# Patient Record
Sex: MALE | Race: BLACK OR AFRICAN AMERICAN | NOT HISPANIC OR LATINO | Employment: UNEMPLOYED | URBAN - METROPOLITAN AREA
[De-identification: names, ages, dates, MRNs, and addresses within clinical notes are randomized per-mention and may not be internally consistent; named-entity substitution may affect disease eponyms.]

---

## 2022-04-02 ENCOUNTER — HISTORICAL (OUTPATIENT)
Dept: ADMINISTRATIVE | Facility: HOSPITAL | Age: 47
End: 2022-04-02

## 2022-11-09 ENCOUNTER — HOSPITAL ENCOUNTER (EMERGENCY)
Facility: HOSPITAL | Age: 47
Discharge: HOME | End: 2022-11-09
Attending: FAMILY MEDICINE
Payer: MEDICAID

## 2022-11-09 VITALS
BODY MASS INDEX: 36.92 KG/M2 | OXYGEN SATURATION: 99 % | TEMPERATURE: 99 F | DIASTOLIC BLOOD PRESSURE: 89 MMHG | HEART RATE: 87 BPM | HEIGHT: 68 IN | WEIGHT: 243.63 LBS | SYSTOLIC BLOOD PRESSURE: 134 MMHG | RESPIRATION RATE: 17 BRPM

## 2022-11-09 DIAGNOSIS — K42.9 UMBILICAL HERNIA WITHOUT OBSTRUCTION AND WITHOUT GANGRENE: ICD-10-CM

## 2022-11-09 DIAGNOSIS — R10.9 ABDOMINAL PAIN: ICD-10-CM

## 2022-11-09 DIAGNOSIS — K52.9 ENTERITIS: Primary | ICD-10-CM

## 2022-11-09 LAB
ALBUMIN SERPL-MCNC: 4.4 GM/DL (ref 3.5–5)
ALBUMIN/GLOB SERPL: 1.4 RATIO (ref 1.1–2)
ALP SERPL-CCNC: 63 UNIT/L (ref 40–150)
ALT SERPL-CCNC: 22 UNIT/L (ref 0–55)
APPEARANCE UR: CLEAR
AST SERPL-CCNC: 17 UNIT/L (ref 5–34)
BACTERIA #/AREA URNS AUTO: ABNORMAL /HPF
BASOPHILS # BLD AUTO: 0.01 X10(3)/MCL (ref 0–0.2)
BASOPHILS NFR BLD AUTO: 0.1 %
BILIRUB UR QL STRIP.AUTO: NEGATIVE MG/DL
BILIRUBIN DIRECT+TOT PNL SERPL-MCNC: 0.9 MG/DL
BUN SERPL-MCNC: 13.8 MG/DL (ref 8.9–20.6)
CALCIUM SERPL-MCNC: 9.8 MG/DL (ref 8.4–10.2)
CHLORIDE SERPL-SCNC: 104 MMOL/L (ref 98–107)
CO2 SERPL-SCNC: 25 MMOL/L (ref 22–29)
COLOR UR AUTO: YELLOW
CREAT SERPL-MCNC: 1.1 MG/DL (ref 0.73–1.18)
EOSINOPHIL # BLD AUTO: 0 X10(3)/MCL (ref 0–0.9)
EOSINOPHIL NFR BLD AUTO: 0 %
ERYTHROCYTE [DISTWIDTH] IN BLOOD BY AUTOMATED COUNT: 13.4 % (ref 11.5–17)
GFR SERPLBLD CREATININE-BSD FMLA CKD-EPI: >60 MLS/MIN/1.73/M2
GLOBULIN SER-MCNC: 3.1 GM/DL (ref 2.4–3.5)
GLUCOSE SERPL-MCNC: 94 MG/DL (ref 74–100)
GLUCOSE UR QL STRIP.AUTO: NORMAL MG/DL
HCT VFR BLD AUTO: 46.7 % (ref 42–52)
HGB BLD-MCNC: 15.9 GM/DL (ref 14–18)
HYALINE CASTS #/AREA URNS LPF: ABNORMAL /LPF
IMM GRANULOCYTES # BLD AUTO: 0.01 X10(3)/MCL (ref 0–0.04)
IMM GRANULOCYTES NFR BLD AUTO: 0.1 %
KETONES UR QL STRIP.AUTO: ABNORMAL MG/DL
LEUKOCYTE ESTERASE UR QL STRIP.AUTO: NEGATIVE UNIT/L
LIPASE SERPL-CCNC: 54 U/L
LYMPHOCYTES # BLD AUTO: 2.17 X10(3)/MCL (ref 0.6–4.6)
LYMPHOCYTES NFR BLD AUTO: 31.5 %
MCH RBC QN AUTO: 32 PG (ref 27–31)
MCHC RBC AUTO-ENTMCNC: 34 MG/DL (ref 33–36)
MCV RBC AUTO: 94 FL (ref 80–94)
MONOCYTES # BLD AUTO: 0.37 X10(3)/MCL (ref 0.1–1.3)
MONOCYTES NFR BLD AUTO: 5.4 %
MUCOUS THREADS URNS QL MICRO: ABNORMAL /LPF
NEUTROPHILS # BLD AUTO: 4.3 X10(3)/MCL (ref 2.1–9.2)
NEUTROPHILS NFR BLD AUTO: 62.9 %
NITRITE UR QL STRIP.AUTO: NEGATIVE
NRBC BLD AUTO-RTO: 0 %
PH UR STRIP.AUTO: 8 [PH]
PLATELET # BLD AUTO: 229 X10(3)/MCL (ref 130–400)
PMV BLD AUTO: 10.2 FL (ref 7.4–10.4)
POTASSIUM SERPL-SCNC: 3.8 MMOL/L (ref 3.5–5.1)
PROT SERPL-MCNC: 7.5 GM/DL (ref 6.4–8.3)
PROT UR QL STRIP.AUTO: ABNORMAL MG/DL
RBC # BLD AUTO: 4.97 X10(6)/MCL (ref 4.7–6.1)
RBC #/AREA URNS AUTO: ABNORMAL /HPF
RBC UR QL AUTO: NEGATIVE UNIT/L
SODIUM SERPL-SCNC: 138 MMOL/L (ref 136–145)
SP GR UR STRIP.AUTO: 1.03
SQUAMOUS #/AREA URNS LPF: ABNORMAL /HPF
UROBILINOGEN UR STRIP-ACNC: ABNORMAL MG/DL
WBC # SPEC AUTO: 6.9 X10(3)/MCL (ref 4.5–11.5)
WBC #/AREA URNS AUTO: ABNORMAL /HPF

## 2022-11-09 PROCEDURE — 99284 EMERGENCY DEPT VISIT MOD MDM: CPT | Mod: 25

## 2022-11-09 PROCEDURE — 80053 COMPREHEN METABOLIC PANEL: CPT | Performed by: NURSE PRACTITIONER

## 2022-11-09 PROCEDURE — 25000003 PHARM REV CODE 250: Performed by: NURSE PRACTITIONER

## 2022-11-09 PROCEDURE — 81001 URINALYSIS AUTO W/SCOPE: CPT | Performed by: NURSE PRACTITIONER

## 2022-11-09 PROCEDURE — 85025 COMPLETE CBC W/AUTO DIFF WBC: CPT | Performed by: NURSE PRACTITIONER

## 2022-11-09 PROCEDURE — 83690 ASSAY OF LIPASE: CPT | Performed by: NURSE PRACTITIONER

## 2022-11-09 RX ORDER — ONDANSETRON 2 MG/ML
4 INJECTION INTRAMUSCULAR; INTRAVENOUS
Status: DISCONTINUED | OUTPATIENT
Start: 2022-11-09 | End: 2022-11-09

## 2022-11-09 RX ORDER — ONDANSETRON 4 MG/1
4 TABLET, ORALLY DISINTEGRATING ORAL
Status: COMPLETED | OUTPATIENT
Start: 2022-11-09 | End: 2022-11-09

## 2022-11-09 RX ORDER — KETOROLAC TROMETHAMINE 30 MG/ML
15 INJECTION, SOLUTION INTRAMUSCULAR; INTRAVENOUS
Status: DISCONTINUED | OUTPATIENT
Start: 2022-11-09 | End: 2022-11-09

## 2022-11-09 RX ORDER — DICYCLOMINE HYDROCHLORIDE 20 MG/1
20 TABLET ORAL EVERY 6 HOURS PRN
Qty: 15 TABLET | Refills: 0 | Status: ON HOLD | OUTPATIENT
Start: 2022-11-09 | End: 2023-10-11 | Stop reason: HOSPADM

## 2022-11-09 RX ORDER — HYDROCODONE BITARTRATE AND ACETAMINOPHEN 10; 325 MG/1; MG/1
1 TABLET ORAL
Status: COMPLETED | OUTPATIENT
Start: 2022-11-09 | End: 2022-11-09

## 2022-11-09 RX ADMIN — HYDROCODONE BITARTRATE AND ACETAMINOPHEN 1 TABLET: 10; 325 TABLET ORAL at 06:11

## 2022-11-09 RX ADMIN — ONDANSETRON 4 MG: 4 TABLET, ORALLY DISINTEGRATING ORAL at 06:11

## 2022-11-09 NOTE — Clinical Note
"Mir Gutiérrez" Rafiq was seen and treated in our emergency department on 11/9/2022.  He may return to work on 11/11/2022.       If you have any questions or concerns, please don't hesitate to call.      Christopher Rucker, FRANKIEP"

## 2022-11-10 NOTE — ED PROVIDER NOTES
Encounter Date: 11/9/2022       History     Chief Complaint   Patient presents with    Abdominal Pain     Pt in with complaints of abdominal pain with nausea and vomiting x 2-3 days.  Pt also complaining of a headache.     The patient presents with abdominal pain.  The onset was 5 days.  The course/duration of symptoms is constant and fluctuating in intensity.  The character of symptoms is dull.  The degree at onset was minimal.  The Location of pain at onset was generalized.  The degree at present is moderate.  The Location of pain at present is generalized.  Radiating pain: none. There are exacerbating factors including eating and movement.  The relieving factor is rest.  Therapy today: percocet, zofran.  Risk factors consist of none.  Associated symptoms: nausea, vomiting, denies chest pain, denies diarrhea, denies back pain, denies shortness of breath, denies fever, denies chills, denies headache and denies dizziness. Normal bm this morning. He was seen at [facility] 2 days ago with CT of abdomen/pelvis - umbilical hernia without evidence of bowel obstruction or strangulation.      Review of patient's allergies indicates:  No Known Allergies  History reviewed. No pertinent past medical history.  History reviewed. No pertinent surgical history.  History reviewed. No pertinent family history.  Social History     Tobacco Use    Smoking status: Every Day     Packs/day: 0.50     Types: Cigarettes    Smokeless tobacco: Never     Review of Systems   Constitutional:  Negative for fever.   HENT:  Negative for sore throat.    Respiratory:  Negative for shortness of breath.    Cardiovascular:  Negative for chest pain.   Gastrointestinal:  Positive for abdominal pain, nausea and vomiting.   Genitourinary:  Negative for dysuria.   Musculoskeletal:  Negative for back pain.   Skin:  Negative for rash.   Neurological:  Negative for weakness.   Hematological:  Does not bruise/bleed easily.   All other systems reviewed and are  negative.    Physical Exam     Initial Vitals [11/09/22 1707]   BP Pulse Resp Temp SpO2   (!) 139/92 64 14 98.5 °F (36.9 °C) 100 %      MAP       --         Physical Exam    Nursing note and vitals reviewed.  Constitutional: He appears well-developed and well-nourished.   HENT:   Head: Normocephalic and atraumatic.   Neck: Neck supple.   Normal range of motion.  Cardiovascular:  Normal rate, regular rhythm, normal heart sounds and intact distal pulses.           Pulmonary/Chest: Breath sounds normal.   Abdominal: Abdomen is soft. Bowel sounds are normal. There is generalized abdominal tenderness.   Mild generalized ttp, small reducible umbilical hernia   Musculoskeletal:         General: Normal range of motion.      Cervical back: Normal range of motion and neck supple.     Neurological: He is alert and oriented to person, place, and time. He has normal strength.   Skin: Skin is warm and dry.   Psychiatric: He has a normal mood and affect.       ED Course   Procedures  Labs Reviewed   URINALYSIS, REFLEX TO URINE CULTURE - Abnormal; Notable for the following components:       Result Value    Protein, UA Trace (*)     Ketones, UA 1+ (*)     Urobilinogen, UA 1+ (*)     Mucous, UA Trace (*)     All other components within normal limits   CBC WITH DIFFERENTIAL - Abnormal; Notable for the following components:    MCH 32.0 (*)     All other components within normal limits   LIPASE - Normal   CBC W/ AUTO DIFFERENTIAL    Narrative:     The following orders were created for panel order CBC auto differential.  Procedure                               Abnormality         Status                     ---------                               -----------         ------                     CBC with Differential[[id number]]        Abnormal            Final result                 Please view results for these tests on the individual orders.   COMPREHENSIVE METABOLIC PANEL   EXTRA TUBES    Narrative:     The following orders were created  for panel order EXTRA TUBES.  Procedure                               Abnormality         Status                     ---------                               -----------         ------                     Light Blue Top Hold[[id number]]                              In process                 Red Top Hold[[id number]]                                     In process                 Gold Top Hold[[id number]]                                    In process                 Pink Top Hold[[id number]]                                    In process                   Please view results for these tests on the individual orders.   LIGHT BLUE TOP HOLD   RED TOP HOLD   GOLD TOP HOLD   PINK TOP HOLD          Imaging Results              X-Ray Abdomen Flat And Erect (Final result)  Result time 11/09/22 18:45:43      Final result by Russ Moran MD (11/09/22 18:45:43)                   Impression:      Findings consistent with ileus or enteritis      Electronically signed by: Russ Moran  Date:    11/09/2022  Time:    18:45               Narrative:    EXAMINATION:  XR ABDOMEN FLAT AND ERECT    CLINICAL HISTORY:  Unspecified abdominal pain    TECHNIQUE:  Flat and erect AP views of the abdomen were performed.    COMPARISON:  04/02/2022    FINDINGS:  There are dilated loops of bowel seen along with some air-fluid levels consistent with ileus or enteritis follow-up is recommended.  No free air is seen.  No free fluid is seen.  No abnormal calcifications are seen.  No organomegaly is seen.  Bones and joints show no acute abnormality                                       Medications   ondansetron disintegrating tablet 4 mg (4 mg Oral Given 11/9/22 1819)   HYDROcodone-acetaminophen  mg per tablet 1 tablet (1 tablet Oral Given 11/9/22 1845)     Medical Decision Making:   History:   Old Records Summarized: records from clinic visits and records from previous admission(s).  Clinical Tests:   Lab Tests: Ordered and  Reviewed  Radiological Study: Ordered and Reviewed  Re-eval at 1915: feels better after medication and wishes to go home, taking po fluids w/o difficulty, abd soft with mild generalized ttp, will f/u with his pcp, referral sent to surgery clinic to evaluate hernia, strict return to the ER instructions given    7:29 PM DISPOSITION: The patient is resting comfortably in no acute distress.  He is hemodynamically stable and is without objective evidence for acute process requiring urgent intervention or hospitalization. I provided counseling to patient with regard to condition, the treatment plan, specific conditions for return, and the importance of follow up. Detailed written and verbal instructions provided to patient and he expressed a verbal understanding of the discharge instructions and overall management plan. Reiterated the importance of medication administration and safety and advised patient to follow up with primary care provider in 3-5 days or sooner if needed.  Answered questions at this time. The patient is stable for discharge.                           Clinical Impression:   Final diagnoses:  [R10.9] Abdominal pain  [K52.9] Enteritis (Primary)  [K42.9] Umbilical hernia without obstruction and without gangrene        ED Disposition Condition    Discharge Stable          ED Prescriptions       Medication Sig Dispense Start Date End Date Auth. Provider    dicyclomine (BENTYL) 20 mg tablet Take 1 tablet (20 mg total) by mouth every 6 (six) hours as needed (stomach cramps). 15 tablet 11/9/2022 -- JUSTIN Tenorio          Follow-up Information       Follow up With Specialties Details Why Contact Info    referral sent to surgery clinic to evaluate umbilical hernia        follow up with your pcp in 3-5 days        [facility] Emergency Medicine  If symptoms worsen [address] [facility] [facility] [address] [phone]             JUSTIN Tenorio  11/09/22 3987

## 2022-11-29 ENCOUNTER — OFFICE VISIT (OUTPATIENT)
Dept: SURGERY | Facility: CLINIC | Age: 47
End: 2022-11-29
Payer: MEDICAID

## 2022-11-29 VITALS
BODY MASS INDEX: 38.04 KG/M2 | HEIGHT: 68 IN | WEIGHT: 251 LBS | HEART RATE: 78 BPM | OXYGEN SATURATION: 100 % | DIASTOLIC BLOOD PRESSURE: 80 MMHG | TEMPERATURE: 98 F | RESPIRATION RATE: 18 BRPM | SYSTOLIC BLOOD PRESSURE: 130 MMHG

## 2022-11-29 DIAGNOSIS — K42.9 UMBILICAL HERNIA WITHOUT OBSTRUCTION AND WITHOUT GANGRENE: ICD-10-CM

## 2022-11-29 DIAGNOSIS — R10.9 ABDOMINAL PAIN, UNSPECIFIED ABDOMINAL LOCATION: Primary | ICD-10-CM

## 2022-11-29 PROCEDURE — 99214 OFFICE O/P EST MOD 30 MIN: CPT | Mod: PBBFAC
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
## 2023-03-27 ENCOUNTER — TELEPHONE (OUTPATIENT)
Dept: SURGERY | Facility: CLINIC | Age: 48
End: 2023-03-27
Payer: MEDICAID

## 2023-03-27 DIAGNOSIS — K43.9 VENTRAL HERNIA WITHOUT OBSTRUCTION OR GANGRENE: Primary | ICD-10-CM

## 2023-03-27 NOTE — TELEPHONE ENCOUNTER
Attempted to call patient and got no answer, no voicemail available.  Called patient's mother to tell her he has an appt for CT scan on 4/10/23 at 7:30 am.she verbalized understanding and will give him the message.

## 2023-04-10 ENCOUNTER — HOSPITAL ENCOUNTER (OUTPATIENT)
Dept: RADIOLOGY | Facility: HOSPITAL | Age: 48
Discharge: HOME | End: 2023-04-10
Attending: STUDENT IN AN ORGANIZED HEALTH CARE EDUCATION/TRAINING PROGRAM
Payer: MEDICAID

## 2023-04-10 DIAGNOSIS — K43.9 VENTRAL HERNIA WITHOUT OBSTRUCTION OR GANGRENE: ICD-10-CM

## 2023-04-10 PROCEDURE — 74177 CT ABD & PELVIS W/CONTRAST: CPT | Mod: TC

## 2023-04-10 PROCEDURE — 25500020 PHARM REV CODE 255

## 2023-04-10 RX ADMIN — IOHEXOL 100 ML: 350 INJECTION, SOLUTION INTRAVENOUS at 08:04

## 2023-04-18 ENCOUNTER — OFFICE VISIT (OUTPATIENT)
Dept: SURGERY | Facility: CLINIC | Age: 48
End: 2023-04-18
Payer: MEDICAID

## 2023-04-18 VITALS
SYSTOLIC BLOOD PRESSURE: 132 MMHG | OXYGEN SATURATION: 100 % | DIASTOLIC BLOOD PRESSURE: 90 MMHG | HEART RATE: 80 BPM | BODY MASS INDEX: 39.31 KG/M2 | RESPIRATION RATE: 18 BRPM | HEIGHT: 68 IN | WEIGHT: 259.38 LBS

## 2023-04-18 DIAGNOSIS — K62.89 PERIANAL PAIN: Primary | ICD-10-CM

## 2023-04-18 PROCEDURE — 99214 OFFICE O/P EST MOD 30 MIN: CPT | Mod: PBBFAC

## 2023-04-18 RX ORDER — SODIUM CHLORIDE 9 MG/ML
INJECTION, SOLUTION INTRAVENOUS CONTINUOUS
Status: CANCELLED | OUTPATIENT
Start: 2023-04-18

## 2023-04-18 RX ORDER — ONDANSETRON 2 MG/ML
4 INJECTION INTRAMUSCULAR; INTRAVENOUS EVERY 12 HOURS PRN
Status: CANCELLED | OUTPATIENT
Start: 2023-04-18

## 2023-04-18 RX ORDER — HEPARIN SODIUM 5000 [USP'U]/ML
5000 INJECTION, SOLUTION INTRAVENOUS; SUBCUTANEOUS EVERY 8 HOURS
Status: CANCELLED | OUTPATIENT
Start: 2023-04-18

## 2023-04-18 NOTE — PROGRESS NOTES
Pt seen by Dr. Marino. Pt will have EUA done 04/21/2023. RTC 1 week after. Pt education given both written and verbal.

## 2023-04-18 NOTE — PROGRESS NOTES
"Surgery Clinic Note     CC: Perianal pain    HPI:  47M PMHx umbilical hernia presenting for follow up of CT AP and perianal pain.     Umbilical hernia:  Denies new symptoms. No obstructive or strangulation symptoms. Tolerating diet. Reducible and no pain.   CT shows umbilical hernia with small bowel     Perianal pain:  History of hemorrhoids s/p surgical intervention multiple times but does not know what exactly was done   Started having this perianal pain episode a few weeks ago. Noted some serous drainage with severe pain around the area with all bowel movement  1-2 BM a day, rock hard stools   Reports feeling a "ball" around the area   No blood with BM   Also with suprapubic pain with BM   Last colonoscopy within past few years and reportedly normal     ROS: Negative except above     Physical Exam  BP (!) 132/90 (BP Location: Left arm, Patient Position: Sitting)   Pulse 80   Resp 18   Ht 5' 8" (1.727 m)   Wt 117.7 kg (259 lb 6.4 oz)   SpO2 100%   BMI 39.44 kg/m²   General: NAD, AAO X 3  CV: Regular rate and rhythm without murmurs  Resp: Clear to ascultation bilaterally  Abdomen: soft, non-tender, non-distended, bowel sounds present. Umbilical hernia palpated, reducible and soft, small 1-2 cm fascial defect   :   Exquisite tenderness perianally, most tenderness to the right lateral with small area of induration and possible sinus that appeared to close off     ASSESSMENT/PLAN  47M PMHx umbilical hernia presenting for follow up of CT AP and perianal pain    -Plan for EUA and all other indicated procedures this Friday 4/21  -RBA discussed and written consent obtained   -Instructed patient to increase fiber intake, water intake, laxatives PRN to have soft normal stools   -No surgical intervention at this time for umbilical hernia. Will continue to monitor as this is not his principal complaint and asymptomatic from this       Karen Marino MD PGY3  [employer] General Surgery         "

## 2023-04-19 ENCOUNTER — ANESTHESIA EVENT (OUTPATIENT)
Dept: SURGERY | Facility: HOSPITAL | Age: 48
End: 2023-04-19
Payer: MEDICAID

## 2023-04-19 NOTE — ANESTHESIA PREPROCEDURE EVALUATION
04/19/2023  Mir Conroy is a 48 y.o., male with PMhx of obesity, smoking presents for rectal EUA secondary to perianal pain.    NO BETA BLOCKER     Active Ambulatory Problems     Diagnosis Date Noted    No Active Ambulatory Problems     Resolved Ambulatory Problems     Diagnosis Date Noted    No Resolved Ambulatory Problems     Past Medical History:   Diagnosis Date    Hypertension            Pre-op Assessment    I have reviewed the NPO Status.      Review of Systems  Anesthesia Hx:  No problems with previous Anesthesia    Social:  Smoker    Cardiovascular:  Cardiovascular Normal     Pulmonary:  Pulmonary Normal    Renal/:  Renal/ Normal     Hepatic/GI:  Hepatic/GI Normal    Neurological:  Neurology Normal    Endocrine:  Obesity / BMI > 30    Vitals:    04/21/23 0846 04/21/23 0851 04/21/23 0939 04/21/23 0955   BP: (!) 160/94  (!) 160/94 (!) 150/72   Pulse: 63   68   Resp:    20   Temp: 36.9 °C (98.4 °F)   36.3 °C (97.3 °F)   TempSrc: Oral   Temporal   SpO2: 99%   100%   Weight:  117.7 kg (259 lb 7.7 oz)           Physical Exam  General: Alert, Cooperative and Well nourished    Airway:  Mallampati: II   Mouth Opening: Normal  TM Distance: Normal  Tongue: Normal  Neck ROM: Normal ROM    Dental:  Intact    Chest/Lungs:  Clear to auscultation, Normal Respiratory Rate    Heart:  Rate: Normal  Rhythm: Regular Rhythm  Sounds: Normal      Lab Results   Component Value Date    WBC 6.9 11/09/2022    HGB 15.9 11/09/2022    HCT 46.7 11/09/2022    MCV 94.0 11/09/2022     11/09/2022       CMP  Sodium Level   Date Value Ref Range Status   11/09/2022 138 136 - 145 mmol/L Final     Potassium Level   Date Value Ref Range Status   11/09/2022 3.8 3.5 - 5.1 mmol/L Final     Carbon Dioxide   Date Value Ref Range Status   11/09/2022 25 22 - 29 mmol/L Final     Blood Urea Nitrogen   Date Value Ref Range Status    11/09/2022 13.8 8.9 - 20.6 mg/dL Final     Creatinine   Date Value Ref Range Status   11/09/2022 1.10 0.73 - 1.18 mg/dL Final     Calcium Level Total   Date Value Ref Range Status   11/09/2022 9.8 8.4 - 10.2 mg/dL Final     Albumin Level   Date Value Ref Range Status   11/09/2022 4.4 3.5 - 5.0 gm/dL Final     Bilirubin Total   Date Value Ref Range Status   11/09/2022 0.9 <=1.5 mg/dL Final     Alkaline Phosphatase   Date Value Ref Range Status   11/09/2022 63 40 - 150 unit/L Final     Aspartate Aminotransferase   Date Value Ref Range Status   11/09/2022 17 5 - 34 unit/L Final     Alanine Aminotransferase   Date Value Ref Range Status   11/09/2022 22 0 - 55 unit/L Final     eGFR   Date Value Ref Range Status   11/09/2022 >60 mls/min/1.73/m2 Final         Anesthesia Plan  Type of Anesthesia, risks & benefits discussed:    Anesthesia Type: Gen ETT  Intra-op Monitoring Plan: Standard ASA Monitors  Post Op Pain Control Plan: IV/PO Opioids PRN  Induction:  IV  Airway Plan: Direct  Informed Consent: Informed consent signed with the Patient and all parties understand the risks and agree with anesthesia plan.  All questions answered.   ASA Score: 2  Day of Surgery Review of History & Physical: H&P Update referred to the surgeon/provider.    Ready For Surgery From Anesthesia Perspective.     .

## 2023-04-21 ENCOUNTER — ANESTHESIA (OUTPATIENT)
Dept: SURGERY | Facility: HOSPITAL | Age: 48
End: 2023-04-21
Payer: MEDICAID

## 2023-04-21 ENCOUNTER — HOSPITAL ENCOUNTER (OUTPATIENT)
Facility: HOSPITAL | Age: 48
Discharge: HOME | End: 2023-04-21
Attending: SURGERY | Admitting: SURGERY
Payer: MEDICAID

## 2023-04-21 VITALS
SYSTOLIC BLOOD PRESSURE: 143 MMHG | DIASTOLIC BLOOD PRESSURE: 90 MMHG | OXYGEN SATURATION: 97 % | BODY MASS INDEX: 39.45 KG/M2 | HEART RATE: 67 BPM | TEMPERATURE: 98 F | RESPIRATION RATE: 20 BRPM | WEIGHT: 259.5 LBS

## 2023-04-21 DIAGNOSIS — K62.89 PERIANAL PAIN: ICD-10-CM

## 2023-04-21 DIAGNOSIS — K61.0 PERIANAL ABSCESS: Primary | ICD-10-CM

## 2023-04-21 PROCEDURE — 71000033 HC RECOVERY, INTIAL HOUR: Performed by: SURGERY

## 2023-04-21 PROCEDURE — 36000704 HC OR TIME LEV I 1ST 15 MIN: Performed by: SURGERY

## 2023-04-21 PROCEDURE — 00902 ANES ANORECTAL PX: CPT | Performed by: SURGERY

## 2023-04-21 PROCEDURE — D9220A PRA ANESTHESIA: ICD-10-PCS | Mod: CRNA,,, | Performed by: NURSE ANESTHETIST, CERTIFIED REGISTERED

## 2023-04-21 PROCEDURE — D9220A PRA ANESTHESIA: Mod: ANES,,, | Performed by: ANESTHESIOLOGY

## 2023-04-21 PROCEDURE — 25000003 PHARM REV CODE 250: Performed by: SURGERY

## 2023-04-21 PROCEDURE — D9220A PRA ANESTHESIA: Mod: CRNA,,, | Performed by: NURSE ANESTHETIST, CERTIFIED REGISTERED

## 2023-04-21 PROCEDURE — 63600175 PHARM REV CODE 636 W HCPCS: Performed by: NURSE ANESTHETIST, CERTIFIED REGISTERED

## 2023-04-21 PROCEDURE — 37000008 HC ANESTHESIA 1ST 15 MINUTES: Performed by: SURGERY

## 2023-04-21 PROCEDURE — 25000003 PHARM REV CODE 250: Performed by: ANESTHESIOLOGY

## 2023-04-21 PROCEDURE — 71000015 HC POSTOP RECOV 1ST HR: Performed by: SURGERY

## 2023-04-21 PROCEDURE — D9220A PRA ANESTHESIA: ICD-10-PCS | Mod: ANES,,, | Performed by: ANESTHESIOLOGY

## 2023-04-21 PROCEDURE — 63600175 PHARM REV CODE 636 W HCPCS: Performed by: SURGERY

## 2023-04-21 PROCEDURE — 71000016 HC POSTOP RECOV ADDL HR: Performed by: SURGERY

## 2023-04-21 PROCEDURE — 36000705 HC OR TIME LEV I EA ADD 15 MIN: Performed by: SURGERY

## 2023-04-21 PROCEDURE — 25000003 PHARM REV CODE 250: Performed by: NURSE ANESTHETIST, CERTIFIED REGISTERED

## 2023-04-21 PROCEDURE — 37000009 HC ANESTHESIA EA ADD 15 MINS: Performed by: SURGERY

## 2023-04-21 PROCEDURE — 63600175 PHARM REV CODE 636 W HCPCS

## 2023-04-21 PROCEDURE — 63600175 PHARM REV CODE 636 W HCPCS: Performed by: ANESTHESIOLOGY

## 2023-04-21 RX ORDER — LIDOCAINE HYDROCHLORIDE 20 MG/ML
INJECTION INTRAVENOUS
Status: DISCONTINUED | OUTPATIENT
Start: 2023-04-21 | End: 2023-04-21

## 2023-04-21 RX ORDER — GLYCOPYRROLATE 0.2 MG/ML
INJECTION INTRAMUSCULAR; INTRAVENOUS
Status: DISCONTINUED | OUTPATIENT
Start: 2023-04-21 | End: 2023-04-21

## 2023-04-21 RX ORDER — SODIUM CHLORIDE 0.9 % (FLUSH) 0.9 %
10 SYRINGE (ML) INJECTION
Status: DISCONTINUED | OUTPATIENT
Start: 2023-04-21 | End: 2023-04-21 | Stop reason: HOSPADM

## 2023-04-21 RX ORDER — PHENYLEPHRINE HYDROCHLORIDE 10 MG/ML
INJECTION INTRAVENOUS
Status: DISCONTINUED | OUTPATIENT
Start: 2023-04-21 | End: 2023-04-21

## 2023-04-21 RX ORDER — ROCURONIUM BROMIDE 10 MG/ML
INJECTION, SOLUTION INTRAVENOUS
Status: DISCONTINUED | OUTPATIENT
Start: 2023-04-21 | End: 2023-04-21

## 2023-04-21 RX ORDER — SODIUM CHLORIDE, SODIUM LACTATE, POTASSIUM CHLORIDE, CALCIUM CHLORIDE 600; 310; 30; 20 MG/100ML; MG/100ML; MG/100ML; MG/100ML
INJECTION, SOLUTION INTRAVENOUS CONTINUOUS
Status: ACTIVE | OUTPATIENT
Start: 2023-04-21

## 2023-04-21 RX ORDER — HYDROCODONE BITARTRATE AND ACETAMINOPHEN 5; 325 MG/1; MG/1
1 TABLET ORAL EVERY 6 HOURS PRN
Qty: 12 TABLET | Refills: 0 | Status: SHIPPED | OUTPATIENT
Start: 2023-04-21 | End: 2023-05-02

## 2023-04-21 RX ORDER — MORPHINE SULFATE 2 MG/ML
INJECTION, SOLUTION INTRAMUSCULAR; INTRAVENOUS
Status: COMPLETED
Start: 2023-04-21 | End: 2023-04-21

## 2023-04-21 RX ORDER — KETAMINE HCL IN 0.9 % NACL 50 MG/5 ML
SYRINGE (ML) INTRAVENOUS
Status: DISCONTINUED | OUTPATIENT
Start: 2023-04-21 | End: 2023-04-21

## 2023-04-21 RX ORDER — HEPARIN SODIUM 5000 [USP'U]/ML
5000 INJECTION, SOLUTION INTRAVENOUS; SUBCUTANEOUS EVERY 8 HOURS
Status: DISCONTINUED | OUTPATIENT
Start: 2023-04-21 | End: 2023-04-21

## 2023-04-21 RX ORDER — ONDANSETRON 2 MG/ML
INJECTION INTRAMUSCULAR; INTRAVENOUS
Status: DISCONTINUED | OUTPATIENT
Start: 2023-04-21 | End: 2023-04-21

## 2023-04-21 RX ORDER — SODIUM CHLORIDE 9 MG/ML
INJECTION, SOLUTION INTRAVENOUS CONTINUOUS
Status: DISCONTINUED | OUTPATIENT
Start: 2023-04-21 | End: 2023-04-21 | Stop reason: HOSPADM

## 2023-04-21 RX ORDER — MIDAZOLAM HYDROCHLORIDE 1 MG/ML
2 INJECTION INTRAMUSCULAR; INTRAVENOUS ONCE AS NEEDED
Status: COMPLETED | OUTPATIENT
Start: 2023-04-21 | End: 2023-04-21

## 2023-04-21 RX ORDER — MEPERIDINE HYDROCHLORIDE 25 MG/ML
12.5 INJECTION INTRAMUSCULAR; INTRAVENOUS; SUBCUTANEOUS EVERY 10 MIN PRN
Status: DISCONTINUED | OUTPATIENT
Start: 2023-04-21 | End: 2023-04-21 | Stop reason: HOSPADM

## 2023-04-21 RX ORDER — HEPARIN SODIUM 5000 [USP'U]/ML
5000 INJECTION, SOLUTION INTRAVENOUS; SUBCUTANEOUS ONCE
Status: COMPLETED | OUTPATIENT
Start: 2023-04-21 | End: 2023-04-21

## 2023-04-21 RX ORDER — OXYCODONE HYDROCHLORIDE 5 MG/1
5 TABLET ORAL
Status: DISCONTINUED | OUTPATIENT
Start: 2023-04-21 | End: 2023-04-21 | Stop reason: HOSPADM

## 2023-04-21 RX ORDER — ONDANSETRON 2 MG/ML
4 INJECTION INTRAMUSCULAR; INTRAVENOUS EVERY 12 HOURS PRN
Status: DISCONTINUED | OUTPATIENT
Start: 2023-04-21 | End: 2023-04-21 | Stop reason: HOSPADM

## 2023-04-21 RX ORDER — MIDAZOLAM HYDROCHLORIDE 1 MG/ML
INJECTION INTRAMUSCULAR; INTRAVENOUS
Status: COMPLETED
Start: 2023-04-21 | End: 2023-04-21

## 2023-04-21 RX ORDER — MORPHINE SULFATE 2 MG/ML
INJECTION, SOLUTION INTRAMUSCULAR; INTRAVENOUS
Status: DISCONTINUED
Start: 2023-04-21 | End: 2023-04-21 | Stop reason: HOSPADM

## 2023-04-21 RX ORDER — DEXMEDETOMIDINE HYDROCHLORIDE 100 UG/ML
INJECTION, SOLUTION INTRAVENOUS
Status: DISCONTINUED | OUTPATIENT
Start: 2023-04-21 | End: 2023-04-21

## 2023-04-21 RX ORDER — BUPIVACAINE HYDROCHLORIDE 2.5 MG/ML
INJECTION, SOLUTION EPIDURAL; INFILTRATION; INTRACAUDAL
Status: DISCONTINUED
Start: 2023-04-21 | End: 2023-04-21 | Stop reason: HOSPADM

## 2023-04-21 RX ORDER — MORPHINE SULFATE 2 MG/ML
2 INJECTION, SOLUTION INTRAMUSCULAR; INTRAVENOUS EVERY 5 MIN PRN
Status: COMPLETED | OUTPATIENT
Start: 2023-04-21 | End: 2023-04-21

## 2023-04-21 RX ORDER — NEOSTIGMINE METHYLSULFATE 1 MG/ML
INJECTION, SOLUTION INTRAVENOUS
Status: DISCONTINUED | OUTPATIENT
Start: 2023-04-21 | End: 2023-04-21

## 2023-04-21 RX ORDER — BUPIVACAINE HYDROCHLORIDE 2.5 MG/ML
INJECTION, SOLUTION EPIDURAL; INFILTRATION; INTRACAUDAL
Status: DISCONTINUED | OUTPATIENT
Start: 2023-04-21 | End: 2023-04-21 | Stop reason: HOSPADM

## 2023-04-21 RX ORDER — ONDANSETRON 2 MG/ML
4 INJECTION INTRAMUSCULAR; INTRAVENOUS DAILY PRN
Status: DISCONTINUED | OUTPATIENT
Start: 2023-04-21 | End: 2023-04-21 | Stop reason: HOSPADM

## 2023-04-21 RX ORDER — DEXAMETHASONE SODIUM PHOSPHATE 4 MG/ML
INJECTION, SOLUTION INTRA-ARTICULAR; INTRALESIONAL; INTRAMUSCULAR; INTRAVENOUS; SOFT TISSUE
Status: DISCONTINUED | OUTPATIENT
Start: 2023-04-21 | End: 2023-04-21

## 2023-04-21 RX ORDER — FENTANYL CITRATE 50 UG/ML
INJECTION, SOLUTION INTRAMUSCULAR; INTRAVENOUS
Status: DISCONTINUED | OUTPATIENT
Start: 2023-04-21 | End: 2023-04-21

## 2023-04-21 RX ORDER — PROPOFOL 10 MG/ML
VIAL (ML) INTRAVENOUS
Status: DISCONTINUED | OUTPATIENT
Start: 2023-04-21 | End: 2023-04-21

## 2023-04-21 RX ADMIN — PROPOFOL 200 MG: 10 INJECTION, EMULSION INTRAVENOUS at 10:04

## 2023-04-21 RX ADMIN — PHENYLEPHRINE HYDROCHLORIDE 200 MCG: 10 INJECTION INTRAVENOUS at 10:04

## 2023-04-21 RX ADMIN — DEXMEDETOMIDINE 10 MCG: 200 INJECTION, SOLUTION INTRAVENOUS at 10:04

## 2023-04-21 RX ADMIN — GLYCOPYRROLATE 0.8 MG: 0.2 INJECTION INTRAMUSCULAR; INTRAVENOUS at 11:04

## 2023-04-21 RX ADMIN — MORPHINE SULFATE 2 MG: 2 INJECTION, SOLUTION INTRAMUSCULAR; INTRAVENOUS at 11:04

## 2023-04-21 RX ADMIN — FENTANYL CITRATE 50 MCG: 50 INJECTION, SOLUTION INTRAMUSCULAR; INTRAVENOUS at 10:04

## 2023-04-21 RX ADMIN — SODIUM CHLORIDE, POTASSIUM CHLORIDE, SODIUM LACTATE AND CALCIUM CHLORIDE: 600; 310; 30; 20 INJECTION, SOLUTION INTRAVENOUS at 09:04

## 2023-04-21 RX ADMIN — DEXAMETHASONE SODIUM PHOSPHATE 8 MG: 4 INJECTION, SOLUTION INTRA-ARTICULAR; INTRALESIONAL; INTRAMUSCULAR; INTRAVENOUS; SOFT TISSUE at 10:04

## 2023-04-21 RX ADMIN — ROCURONIUM BROMIDE 20 MG: 10 SOLUTION INTRAVENOUS at 10:04

## 2023-04-21 RX ADMIN — Medication 25 MG: at 10:04

## 2023-04-21 RX ADMIN — MIDAZOLAM HYDROCHLORIDE 2 MG: 1 INJECTION INTRAMUSCULAR; INTRAVENOUS at 09:04

## 2023-04-21 RX ADMIN — FENTANYL CITRATE 25 MCG: 50 INJECTION, SOLUTION INTRAMUSCULAR; INTRAVENOUS at 10:04

## 2023-04-21 RX ADMIN — MORPHINE SULFATE 2 MG: 2 INJECTION, SOLUTION INTRAMUSCULAR; INTRAVENOUS at 12:04

## 2023-04-21 RX ADMIN — HEPARIN SODIUM 5000 UNITS: 5000 INJECTION, SOLUTION INTRAVENOUS; SUBCUTANEOUS at 09:04

## 2023-04-21 RX ADMIN — OXYCODONE HYDROCHLORIDE 5 MG: 5 TABLET ORAL at 12:04

## 2023-04-21 RX ADMIN — NEOSTIGMINE METHYLSULFATE 5 MG: 1 INJECTION INTRAVENOUS at 11:04

## 2023-04-21 RX ADMIN — LIDOCAINE HYDROCHLORIDE 50 MG: 20 INJECTION, SOLUTION INTRAVENOUS at 10:04

## 2023-04-21 RX ADMIN — ONDANSETRON 4 MG: 2 INJECTION INTRAMUSCULAR; INTRAVENOUS at 10:04

## 2023-04-21 RX ADMIN — ROCURONIUM BROMIDE 50 MG: 10 SOLUTION INTRAVENOUS at 10:04

## 2023-04-21 RX ADMIN — MIDAZOLAM HYDROCHLORIDE 2 MG: 1 INJECTION, SOLUTION INTRAMUSCULAR; INTRAVENOUS at 09:04

## 2023-04-21 NOTE — TRANSFER OF CARE
Anesthesia Transfer of Care Note    Patient: Mir Conroy    Procedure(s) Performed: Procedure(s) (LRB):  Exam under anesthesia (N/A)    Patient location: PACU    Anesthesia Type: general    Transport from OR: Transported from OR on room air with adequate spontaneous ventilation    Post pain: adequate analgesia    Post assessment: no apparent anesthetic complications and tolerated procedure well    Post vital signs: stable    Level of consciousness: sedated    Nausea/Vomiting: no nausea/vomiting    Complications: none    Transfer of care protocol was followed      Last vitals:   Visit Vitals  BP (!) 150/72   Pulse 68   Temp 36.3 °C (97.3 °F) (Temporal)   Resp 20   Wt 117.7 kg (259 lb 7.7 oz)   SpO2 100%   BMI 39.45 kg/m²

## 2023-04-21 NOTE — ANESTHESIA PROCEDURE NOTES
Intubation    Date/Time: 4/21/2023 10:16 AM  Performed by: Nicole Al CRNA  Authorized by: Melvi Huber MD     Intubation:     Induction:  Intravenous    Intubated:  Postinduction    Mask Ventilation:  Easy with oral airway    Attempts:  1    Attempted By:  Student    Method of Intubation:  Direct    Blade:  Henriquez 2    Laryngeal View Grade: Grade I - full view of cords      Difficult Airway Encountered?: No      Complications:  None    Airway Device:  Oral endotracheal tube    Airway Device Size:  7.5    Style/Cuff Inflation:  Cuffed (inflated to minimal occlusive pressure)    Inflation Amount (mL):  5    Tube secured:  23    Secured at:  The lips    Placement Verified By:  Capnometry    Complicating Factors:  None    Findings Post-Intubation:  BS equal bilateral and atraumatic/condition of teeth unchanged

## 2023-04-21 NOTE — OP NOTE
[facility]  General Surgery  Operative Note    SUMMARY     Date of Procedure: 4/21/2023     Procedure:   Anal exam under anesthesia   Incision and drainage of gluteal abscess       Surgeon(s) and Role:     * Davin Lombardi MD - Primary       Karen Marino MD PGY3     Pre-Operative Diagnosis:   Perianal pain   Gluteal abscess    Post-Operative Diagnosis: Same as above     Anesthesia: General    Operative Findings (including complications, if any): No evidence of fistula   Right gluteal abscess     Description of Technical Procedures: Patient was brought to the operating room and general endotracheal anesthesia was administered. The patient was then positioned prone laurita knife on the operating table. The surgical area was prepped draped in a standard sterile fashion. A formal timeout was performed confirming correct patient, procedure, preoperative antibiotics and anticoagulation. All was agreed upon. A digital rectal exam was performed. There were no masses or hemorrhoids palpated but the internal anal sphincter was very tight. A hill matute retractor was inserted and the anal canal inspected. The posterior midline appeared to have a sinus with some drainage and possible fistulous connection but no connection found with probe. The area was indurated, therefore incision was made but there was no abscess cavity encountered.    Attention was turned to the right medial buttock at area of induration and fluctuance. Incision was made over this area and sinus broken up with a hemostat. There was purulent drainage. Probing the cavity, it did track about 5 cm directly anterior. The sinus was irrigated and packed with 1/4 inch iodoform packing material.     Patient tolerated the procedure without immediate complications extubated and taken to PACU in stable condition.     All counts were correct x2    Dr. Lombardi was present and scrubbed for the entirety of the case.     Estimated Blood Loss  (EBL): 15 cc           Implants: * No implants in log *    Specimens:   None           Condition: Stable    Disposition: PACU - hemodynamically stable.      Karen Marino MD  [employer] General Surgery PGY3

## 2023-04-21 NOTE — ANESTHESIA POSTPROCEDURE EVALUATION
Anesthesia Post Evaluation    Patient: Mir Conroy    Procedure(s) Performed: Procedure(s) (LRB):  Exam under anesthesia (N/A)    Final Anesthesia Type: general      Patient location during evaluation: DOSC  Post-procedure vital signs: reviewed and stable  Airway patency: patent      Anesthetic complications: no      Cardiovascular status: hemodynamically stable  Respiratory status: spontaneous ventilation  Follow-up not needed.          Vitals Value Taken Time   /90 04/21/23 1400   Temp 36.5 °C (97.7 °F) 04/21/23 1220   Pulse 67 04/21/23 1400   Resp 20 04/21/23 1400   SpO2 97 % 04/21/23 1400         Event Time   Out of Recovery 12:12:00         Pain/Kinga Score: Pain Rating Prior to Med Admin: 10 (4/21/2023 12:45 PM)  Pain Rating Post Med Admin: 7 (4/21/2023 12:10 PM)  Kinga Score: 10 (4/21/2023 12:02 PM)  Modified Kinga Score: 20 (4/21/2023  2:01 PM)

## 2023-04-21 NOTE — H&P
"Pre-OP H&P      CC: Perianal pain     HPI:  47M PMHx umbilical hernia presenting for follow up of CT AP and perianal pain.      Umbilical hernia:  Denies new symptoms. No obstructive or strangulation symptoms. Tolerating diet. Reducible and no pain.   CT shows umbilical hernia with small bowel      Perianal pain:  History of hemorrhoids s/p surgical intervention multiple times but does not know what exactly was done   Started having this perianal pain episode a few weeks ago. Noted some serous drainage with severe pain around the area with all bowel movement  1-2 BM a day, rock hard stools   Reports feeling a "ball" around the area   No blood with BM   Also with suprapubic pain with BM   Last colonoscopy within past few years and reportedly normal      ROS: Negative except above      Physical Exam  BP (!) 132/90 (BP Location: Left arm, Patient Position: Sitting)   Pulse 80   Resp 18   Ht 5' 8" (1.727 m)   Wt 117.7 kg (259 lb 6.4 oz)   SpO2 100%   BMI 39.44 kg/m²   General: NAD, AAO X 3  CV: Regular rate and rhythm without murmurs  Resp: Clear to ascultation bilaterally  Abdomen: soft, non-tender, non-distended, bowel sounds present. Umbilical hernia palpated, reducible and soft, small 1-2 cm fascial defect   :   Exquisite tenderness perianally, most tenderness to the right lateral with small area of induration and possible sinus that appeared to close off      ASSESSMENT/PLAN  47M PMHx umbilical hernia presenting for follow up of CT AP and perianal pain     -To OR today for EUA and all other indicated procedures this Friday 4/21      Aren Nguyen MD  PGY-1   [employer] General Surgery                "

## 2023-04-24 NOTE — DISCHARGE SUMMARY
[facility] - Periop Services  Discharge Note  Short Stay    Procedure(s) (LRB):  Exam under anesthesia (N/A)  INCISION AND DRAINAGE, BUTTOCK (N/A)      OUTCOME: Patient tolerated treatment/procedure well without complication and is now ready for discharge.    DISPOSITION: Home or Self Care    FINAL DIAGNOSIS:  <principal problem not specified>    FOLLOWUP: In clinic    DISCHARGE INSTRUCTIONS:    Discharge Procedure Orders   Diet Adult Regular     Notify your health care provider if you experience any of the following:  temperature >100.4     Notify your health care provider if you experience any of the following:  persistent nausea and vomiting or diarrhea     Notify your health care provider if you experience any of the following:  severe uncontrolled pain     Notify your health care provider if you experience any of the following:  redness, tenderness, or signs of infection (pain, swelling, redness, odor or green/yellow discharge around incision site)     Remove dressing in 24 hours   Order Comments: Gauze can be taken off in 24 hours. Keep the area clean and dry. There is packing in the area which will stay in until follow up in clinic on Tuesday 4/25     Activity as tolerated         Clinical Reference Documents Added to Patient Instructions         Document    HYDROCODONE AND ACETAMINOPHEN, ADULT (ENGLISH)            TIME SPENT ON DISCHARGE: < 30 minutes    Karen Marino MD  [employer] General Surgery PGY3

## 2023-04-25 ENCOUNTER — OFFICE VISIT (OUTPATIENT)
Dept: SURGERY | Facility: CLINIC | Age: 48
End: 2023-04-25
Payer: MEDICAID

## 2023-04-25 VITALS
WEIGHT: 257 LBS | TEMPERATURE: 98 F | HEIGHT: 68 IN | RESPIRATION RATE: 18 BRPM | BODY MASS INDEX: 38.95 KG/M2 | OXYGEN SATURATION: 97 % | DIASTOLIC BLOOD PRESSURE: 62 MMHG | SYSTOLIC BLOOD PRESSURE: 106 MMHG | HEART RATE: 62 BPM

## 2023-04-25 DIAGNOSIS — K62.89 PERIANAL PAIN: Primary | ICD-10-CM

## 2023-04-25 PROCEDURE — 99214 OFFICE O/P EST MOD 30 MIN: CPT | Mod: PBBFAC

## 2023-04-25 RX ORDER — HYDROCODONE BITARTRATE AND ACETAMINOPHEN 5; 325 MG/1; MG/1
1 TABLET ORAL EVERY 6 HOURS PRN
Qty: 12 TABLET | Refills: 0 | Status: SHIPPED | OUTPATIENT
Start: 2023-04-25 | End: 2023-05-02

## 2023-04-25 RX ORDER — CLINDAMYCIN HYDROCHLORIDE 300 MG/1
300 CAPSULE ORAL EVERY 6 HOURS
Qty: 28 CAPSULE | Refills: 0 | Status: SHIPPED | OUTPATIENT
Start: 2023-04-25 | End: 2023-05-02

## 2023-04-25 NOTE — PROGRESS NOTES
Surgery Clinic Note     CC: 1 wk follow up s/p EUA w/ abscess drainage    HPI:  48-year-old with a PHMx of hemorrhoids (s/p hemorrhoidectomy) and HTN 1 week s/p EUA with gluteal abscess drainage. On presentation today he is severe pain in bilateral gluteal regions. He is unable to tolerate an in office exam. The pain is improved but not controlled on lortabs. He loosely covers the site with gauze and changes multiple times a day. He has continued to have constipation with 2 days between BM since surgery. When he has BM they are large and painful and require a large amount of force. This causes pain radiating throughout the entire groin. His wife is changing his bandages and notes stitches in place. The wound is still draining a tan liquid with pus.    PMH:   Past Medical History:   Diagnosis Date    Hypertension       PSH:   Past Surgical History:   Procedure Laterality Date    EXAMINATION UNDER ANESTHESIA N/A 4/21/2023    Procedure: Exam under anesthesia;  Surgeon: Davin Lombardi MD;  Location: [facility] OR;  Service: General;  Laterality: N/A;    hemarrhoids      INCISION AND DRAINAGE OF BUTTOCK N/A 4/21/2023    Procedure: INCISION AND DRAINAGE, BUTTOCK;  Surgeon: Davin Lombardi MD;  Location: [facility] OR;  Service: General;  Laterality: N/A;  gluteal abscess        Fam Hx: History reviewed. No pertinent family history.     Allergies: Review of patient's allergies indicates:  No Known Allergies     ROS: Negative except above     Current Outpatient Medications on File Prior to Visit   Medication Sig Dispense Refill    dicyclomine (BENTYL) 20 mg tablet Take 1 tablet (20 mg total) by mouth every 6 (six) hours as needed (stomach cramps). (Patient not taking: Reported on 4/18/2023) 15 tablet 0    HYDROcodone-acetaminophen (NORCO) 5-325 mg per tablet Take 1 tablet by mouth every 6 (six) hours as needed for Pain. 12 tablet 0     Current Facility-Administered Medications on File Prior to Visit   Medication Dose Route  "Frequency Provider Last Rate Last Admin    lactated ringers infusion   Intravenous Continuous Melvi Huber MD   Stopped at 04/21/23 1122       Physical Exam  /62 (BP Location: Left arm, Patient Position: Sitting)   Pulse 62   Temp 98.4 °F (36.9 °C)   Resp 18   Ht 5' 8" (1.727 m)   Wt 116.6 kg (257 lb)   SpO2 97%   BMI 39.08 kg/m²   General: NAD, AAO X 3, in visible pain  Unable to tolerate full exam  Loose gauze and packing in place with purulent drainage    ASSESSMENT/PLAN  48-year-old man with a PMHx of hemorrhoids (s/p hemorrhoidectomy) and HTN presenting for 1 week follow up of EUA with gluteal abscess I&D. He is in a large amount of pain and cannot tolerate an exam in office. His constipation is also not controlled.    -daily miralax  -continue lortabs for pain control    Priya Hi MS3    Resident Addendum:  Agree with above documentation by medical student.   48M s/p EUA and I&D of R gluteal abscess 4/21/2023 presenting for follow up and packing removal. Still having severe pain in the surgical site. Denies fevers/chills. Continued constipation   Exam: severe tenderness to the I&D area, packing removed and sinus appears clean, no purulence  Plan:   Clindamycin x1 week  Miralax, Mg citrate for constipation   Keep area clean   Follow up in 1 week - may need repeat EUA with CRS due to hx of multiple hemorrhoidectomy    Karen Marino MD  [employer] General Surgery PGY3       "

## 2023-04-25 NOTE — PROGRESS NOTES
Patient seen by Dr Marino.  Will f/u in 1 week. Prescriptions given in clinic.  Antibiotic and pain meds.

## 2023-05-02 ENCOUNTER — OFFICE VISIT (OUTPATIENT)
Dept: SURGERY | Facility: CLINIC | Age: 48
End: 2023-05-02
Payer: MEDICAID

## 2023-05-02 VITALS
HEIGHT: 68 IN | WEIGHT: 256 LBS | BODY MASS INDEX: 38.8 KG/M2 | RESPIRATION RATE: 18 BRPM | DIASTOLIC BLOOD PRESSURE: 80 MMHG | OXYGEN SATURATION: 96 % | HEART RATE: 61 BPM | SYSTOLIC BLOOD PRESSURE: 138 MMHG | TEMPERATURE: 98 F

## 2023-05-02 DIAGNOSIS — K62.89 ANAL PAIN: Primary | ICD-10-CM

## 2023-05-02 PROCEDURE — 99213 OFFICE O/P EST LOW 20 MIN: CPT | Mod: PBBFAC

## 2023-05-02 RX ORDER — METHOCARBAMOL 500 MG/1
500 TABLET, FILM COATED ORAL 4 TIMES DAILY
Qty: 40 TABLET | Refills: 0 | Status: SHIPPED | OUTPATIENT
Start: 2023-05-02 | End: 2023-05-12

## 2023-05-02 RX ORDER — HYDROCODONE BITARTRATE AND ACETAMINOPHEN 10; 325 MG/1; MG/1
1 TABLET ORAL EVERY 6 HOURS PRN
Qty: 10 TABLET | Refills: 0 | Status: SHIPPED | OUTPATIENT
Start: 2023-05-02 | End: 2023-05-02 | Stop reason: ALTCHOICE

## 2023-05-02 NOTE — PROGRESS NOTES
Surgery Clinic Note     CC: 2 week f/u perianal abscess drainage    HPI:  48-year-old man s/p multiple hemorrhoidectomies and 2 weeks s/p perianal abscess drainage. He was seen in clinic last week noting severe perianal pain radiating to the groin. He was unable to tolerate a clinical exam at that time and was prescribed a 1 week course of clindamycin as well as recommended bowel regimen to control constipation. As of today he is having daily, very hard bowel movements which cause 45 minutes of debilitating pain afterward. The pain is mostly burning throughout the anal region, groin, and pelvis. He also has increased sensation of needing to push after bowel movements, which often results in increased urination. When pressing on his pelvis he notes a shooting pain to his anal sphincter. His post-defalcation pain is sometimes controlled with ibuprofen but often requires lortabs. He denies any fevers, rectal bleeding, hematochezia, or abscess drainage. He has completed his clindamycin and is adherent to his bowel regimen of miralax and fiber.       PMH:   Past Medical History:   Diagnosis Date    Hypertension          PSH:   Past Surgical History:   Procedure Laterality Date    EXAMINATION UNDER ANESTHESIA N/A 2023    Procedure: Exam under anesthesia;  Surgeon: Davin Lombardi MD;  Location: [facility] OR;  Service: General;  Laterality: N/A;    hemarrhoids      INCISION AND DRAINAGE OF BUTTOCK N/A 2023    Procedure: INCISION AND DRAINAGE, BUTTOCK;  Surgeon: Davin Lombardi MD;  Location: [facility] OR;  Service: General;  Laterality: N/A;  gluteal abscess         Fam Hx: History reviewed. No pertinent family history.      Social Hx:   Social History     Socioeconomic History    Marital status:    Tobacco Use    Smoking status: Former     Packs/day: 0.50     Years: 3.00     Pack years: 1.50     Types: Cigarettes     Quit date:      Years since quittin.3    Smokeless tobacco: Never   Substance and  "Sexual Activity    Alcohol use: Never    Drug use: Yes     Frequency: 1.0 times per week     Types: Marijuana     Comment: weekly         Allergies: Review of patient's allergies indicates:  No Known Allergies      ROS: Negative except above     Current Outpatient Medications on File Prior to Visit   Medication Sig Dispense Refill    clindamycin (CLEOCIN) 300 MG capsule Take 1 capsule (300 mg total) by mouth every 6 (six) hours. for 7 days 28 capsule 0    dicyclomine (BENTYL) 20 mg tablet Take 1 tablet (20 mg total) by mouth every 6 (six) hours as needed (stomach cramps). 15 tablet 0    HYDROcodone-acetaminophen (NORCO) 5-325 mg per tablet Take 1 tablet by mouth every 6 (six) hours as needed for Pain. 12 tablet 0    HYDROcodone-acetaminophen (NORCO) 5-325 mg per tablet Take 1 tablet by mouth every 6 (six) hours as needed for Pain. 12 tablet 0    hydrocortisone (PROCTOCORT) 10 % (80 mg) rectal foam Place 1 applicator rectally 2 (two) times daily. 15 g 0     Current Facility-Administered Medications on File Prior to Visit   Medication Dose Route Frequency Provider Last Rate Last Admin    lactated ringers infusion   Intravenous Continuous Melvi Huber MD   Stopped at 04/21/23 1122       Physical Exam  /80 (BP Location: Right arm, Patient Position: Sitting)   Pulse 61   Temp 98.4 °F (36.9 °C)   Resp 18   Ht 5' 8" (1.727 m)   Wt 116.1 kg (256 lb)   SpO2 96%   BMI 38.92 kg/m²   General: NAD, AAO X 3  Anal: point of induration near anal sphincter suspicious for further infection, tender      ASSESSMENT/PLAN  48-year-old man 2 weeks s/p perianal I&D with persistent perianal and groin pain suspicious for ongoing infection with possible fistula.    - EUA scheduled for 5/10/23  - Patient consented in clinic today  - Patient counseled to increase miralax dose as needed so that he is having 1 soft BM daily  - Encouraged water and fiber intake  - Discussed return precautions in clinic, including fever, " increased erythema, swelling fluctuance, N/V     Priya Hi, MS3     PGY1 Attestation   I have seen and examined the patient with the medical student above. I agree with the above documentation and the note was edited as necessary.    Leeanna Zamora MD  [employer] Surgery PGY-1

## 2023-05-02 NOTE — PROGRESS NOTES
Patient seen by Dr Zamora.  Surgery scheduled for 5/10/23 with 2 week post op.  Information regarding pre op explained.

## 2023-05-02 NOTE — H&P (VIEW-ONLY)
Surgery Clinic Note     CC: 2 week f/u perianal abscess drainage    HPI:  48-year-old man s/p multiple hemorrhoidectomies and 2 weeks s/p perianal abscess drainage. He was seen in clinic last week noting severe perianal pain radiating to the groin. He was unable to tolerate a clinical exam at that time and was prescribed a 1 week course of clindamycin as well as recommended bowel regimen to control constipation. As of today he is having daily, very hard bowel movements which cause 45 minutes of debilitating pain afterward. The pain is mostly burning throughout the anal region, groin, and pelvis. He also has increased sensation of needing to push after bowel movements, which often results in increased urination. When pressing on his pelvis he notes a shooting pain to his anal sphincter. His post-defalcation pain is sometimes controlled with ibuprofen but often requires lortabs. He denies any fevers, rectal bleeding, hematochezia, or abscess drainage. He has completed his clindamycin and is adherent to his bowel regimen of miralax and fiber.       PMH:   Past Medical History:   Diagnosis Date    Hypertension          PSH:   Past Surgical History:   Procedure Laterality Date    EXAMINATION UNDER ANESTHESIA N/A 2023    Procedure: Exam under anesthesia;  Surgeon: Davin Lombardi MD;  Location: [facility] OR;  Service: General;  Laterality: N/A;    hemarrhoids      INCISION AND DRAINAGE OF BUTTOCK N/A 2023    Procedure: INCISION AND DRAINAGE, BUTTOCK;  Surgeon: Davin Lombardi MD;  Location: [facility] OR;  Service: General;  Laterality: N/A;  gluteal abscess         Fam Hx: History reviewed. No pertinent family history.      Social Hx:   Social History     Socioeconomic History    Marital status:    Tobacco Use    Smoking status: Former     Packs/day: 0.50     Years: 3.00     Pack years: 1.50     Types: Cigarettes     Quit date:      Years since quittin.3    Smokeless tobacco: Never   Substance and  "Sexual Activity    Alcohol use: Never    Drug use: Yes     Frequency: 1.0 times per week     Types: Marijuana     Comment: weekly         Allergies: Review of patient's allergies indicates:  No Known Allergies      ROS: Negative except above     Current Outpatient Medications on File Prior to Visit   Medication Sig Dispense Refill    clindamycin (CLEOCIN) 300 MG capsule Take 1 capsule (300 mg total) by mouth every 6 (six) hours. for 7 days 28 capsule 0    dicyclomine (BENTYL) 20 mg tablet Take 1 tablet (20 mg total) by mouth every 6 (six) hours as needed (stomach cramps). 15 tablet 0    HYDROcodone-acetaminophen (NORCO) 5-325 mg per tablet Take 1 tablet by mouth every 6 (six) hours as needed for Pain. 12 tablet 0    HYDROcodone-acetaminophen (NORCO) 5-325 mg per tablet Take 1 tablet by mouth every 6 (six) hours as needed for Pain. 12 tablet 0    hydrocortisone (PROCTOCORT) 10 % (80 mg) rectal foam Place 1 applicator rectally 2 (two) times daily. 15 g 0     Current Facility-Administered Medications on File Prior to Visit   Medication Dose Route Frequency Provider Last Rate Last Admin    lactated ringers infusion   Intravenous Continuous Melvi Huber MD   Stopped at 04/21/23 1122       Physical Exam  /80 (BP Location: Right arm, Patient Position: Sitting)   Pulse 61   Temp 98.4 °F (36.9 °C)   Resp 18   Ht 5' 8" (1.727 m)   Wt 116.1 kg (256 lb)   SpO2 96%   BMI 38.92 kg/m²   General: NAD, AAO X 3  Anal: point of induration near anal sphincter suspicious for further infection, tender      ASSESSMENT/PLAN  48-year-old man 2 weeks s/p perianal I&D with persistent perianal and groin pain suspicious for ongoing infection with possible fistula.    - EUA scheduled for 5/10/23  - Patient consented in clinic today  - Patient counseled to increase miralax dose as needed so that he is having 1 soft BM daily  - Encouraged water and fiber intake  - Discussed return precautions in clinic, including fever, " increased erythema, swelling fluctuance, N/V     Priya Hi, MS3     PGY1 Attestation   I have seen and examined the patient with the medical student above. I agree with the above documentation and the note was edited as necessary.    Leeanna Zamora MD  [employer] Surgery PGY-1

## 2023-05-02 NOTE — PROGRESS NOTES
I have reviewed the notes, assessments, and/or procedures performed by the resident, I concur with her/his documentation of Mir Conroy.     Nuha Riggs MD    Continued concern for perianal abscess vs fistula in ano. Agree with EUA and drainage vs seton placement.

## 2023-05-10 ENCOUNTER — HOSPITAL ENCOUNTER (EMERGENCY)
Facility: HOSPITAL | Age: 48
Discharge: HOME | End: 2023-05-10
Attending: EMERGENCY MEDICINE
Payer: MEDICAID

## 2023-05-10 VITALS
WEIGHT: 257.94 LBS | BODY MASS INDEX: 39.09 KG/M2 | SYSTOLIC BLOOD PRESSURE: 128 MMHG | HEIGHT: 68 IN | OXYGEN SATURATION: 96 % | TEMPERATURE: 98 F | HEART RATE: 62 BPM | DIASTOLIC BLOOD PRESSURE: 89 MMHG | RESPIRATION RATE: 16 BRPM

## 2023-05-10 DIAGNOSIS — K62.89 RECTAL PAIN: Primary | ICD-10-CM

## 2023-05-10 LAB
ALBUMIN SERPL-MCNC: 4.1 G/DL (ref 3.5–5)
ALBUMIN/GLOB SERPL: 1.3 RATIO (ref 1.1–2)
ALP SERPL-CCNC: 64 UNIT/L (ref 40–150)
ALT SERPL-CCNC: 26 UNIT/L (ref 0–55)
APPEARANCE UR: CLEAR
AST SERPL-CCNC: 18 UNIT/L (ref 5–34)
BACTERIA #/AREA URNS AUTO: ABNORMAL /HPF
BASOPHILS # BLD AUTO: 0.01 X10(3)/MCL
BASOPHILS NFR BLD AUTO: 0.2 %
BILIRUB UR QL STRIP.AUTO: NEGATIVE MG/DL
BILIRUBIN DIRECT+TOT PNL SERPL-MCNC: 0.3 MG/DL
BUN SERPL-MCNC: 19.6 MG/DL (ref 8.9–20.6)
CALCIUM SERPL-MCNC: 9.7 MG/DL (ref 8.4–10.2)
CHLORIDE SERPL-SCNC: 108 MMOL/L (ref 98–107)
CO2 SERPL-SCNC: 23 MMOL/L (ref 22–29)
COLOR UR: ABNORMAL
CREAT SERPL-MCNC: 1.22 MG/DL (ref 0.73–1.18)
EOSINOPHIL # BLD AUTO: 0.11 X10(3)/MCL (ref 0–0.9)
EOSINOPHIL NFR BLD AUTO: 2.3 %
ERYTHROCYTE [DISTWIDTH] IN BLOOD BY AUTOMATED COUNT: 14.1 % (ref 11.5–17)
GFR SERPLBLD CREATININE-BSD FMLA CKD-EPI: >60 MLS/MIN/1.73/M2
GLOBULIN SER-MCNC: 3.2 GM/DL (ref 2.4–3.5)
GLUCOSE SERPL-MCNC: 98 MG/DL (ref 74–100)
GLUCOSE UR QL STRIP.AUTO: NORMAL MG/DL
HCT VFR BLD AUTO: 43.5 % (ref 42–52)
HGB BLD-MCNC: 14.5 G/DL (ref 14–18)
HYALINE CASTS #/AREA URNS LPF: ABNORMAL /LPF
IMM GRANULOCYTES # BLD AUTO: 0 X10(3)/MCL (ref 0–0.04)
IMM GRANULOCYTES NFR BLD AUTO: 0 %
KETONES UR QL STRIP.AUTO: ABNORMAL MG/DL
LEUKOCYTE ESTERASE UR QL STRIP.AUTO: NEGATIVE UNIT/L
LYMPHOCYTES # BLD AUTO: 2.14 X10(3)/MCL (ref 0.6–4.6)
LYMPHOCYTES NFR BLD AUTO: 45.6 %
MCH RBC QN AUTO: 31 PG (ref 27–31)
MCHC RBC AUTO-ENTMCNC: 33.3 G/DL (ref 33–36)
MCV RBC AUTO: 92.9 FL (ref 80–94)
MONOCYTES # BLD AUTO: 0.41 X10(3)/MCL (ref 0.1–1.3)
MONOCYTES NFR BLD AUTO: 8.7 %
MUCOUS THREADS URNS QL MICRO: ABNORMAL /LPF
NEUTROPHILS # BLD AUTO: 2.02 X10(3)/MCL (ref 2.1–9.2)
NEUTROPHILS NFR BLD AUTO: 43.2 %
NITRITE UR QL STRIP.AUTO: NEGATIVE
NRBC BLD AUTO-RTO: 0 %
PH UR STRIP.AUTO: 5.5 [PH]
PLATELET # BLD AUTO: 230 X10(3)/MCL (ref 130–400)
PMV BLD AUTO: 10.6 FL (ref 7.4–10.4)
POTASSIUM SERPL-SCNC: 4 MMOL/L (ref 3.5–5.1)
PROT SERPL-MCNC: 7.3 GM/DL (ref 6.4–8.3)
PROT UR QL STRIP.AUTO: ABNORMAL MG/DL
RBC # BLD AUTO: 4.68 X10(6)/MCL (ref 4.7–6.1)
RBC #/AREA URNS AUTO: ABNORMAL /HPF
RBC UR QL AUTO: NEGATIVE UNIT/L
SODIUM SERPL-SCNC: 141 MMOL/L (ref 136–145)
SP GR UR STRIP.AUTO: 1.04
SQUAMOUS #/AREA URNS LPF: ABNORMAL /HPF
UROBILINOGEN UR STRIP-ACNC: NORMAL MG/DL
WBC # SPEC AUTO: 4.69 X10(3)/MCL (ref 4.5–11.5)
WBC #/AREA URNS AUTO: ABNORMAL /HPF

## 2023-05-10 PROCEDURE — 99285 EMERGENCY DEPT VISIT HI MDM: CPT | Mod: 25

## 2023-05-10 PROCEDURE — 81001 URINALYSIS AUTO W/SCOPE: CPT | Performed by: PHYSICIAN ASSISTANT

## 2023-05-10 PROCEDURE — 25500020 PHARM REV CODE 255: Performed by: EMERGENCY MEDICINE

## 2023-05-10 PROCEDURE — 96374 THER/PROPH/DIAG INJ IV PUSH: CPT | Mod: 59

## 2023-05-10 PROCEDURE — 63600175 PHARM REV CODE 636 W HCPCS: Performed by: EMERGENCY MEDICINE

## 2023-05-10 PROCEDURE — 80053 COMPREHEN METABOLIC PANEL: CPT | Performed by: PHYSICIAN ASSISTANT

## 2023-05-10 PROCEDURE — 85025 COMPLETE CBC W/AUTO DIFF WBC: CPT | Performed by: PHYSICIAN ASSISTANT

## 2023-05-10 RX ORDER — HYDROCODONE BITARTRATE AND ACETAMINOPHEN 5; 325 MG/1; MG/1
1 TABLET ORAL EVERY 6 HOURS PRN
Qty: 8 TABLET | Refills: 0 | Status: ON HOLD | OUTPATIENT
Start: 2023-05-10 | End: 2023-05-19 | Stop reason: HOSPADM

## 2023-05-10 RX ORDER — KETOROLAC TROMETHAMINE 30 MG/ML
15 INJECTION, SOLUTION INTRAMUSCULAR; INTRAVENOUS
Status: COMPLETED | OUTPATIENT
Start: 2023-05-10 | End: 2023-05-10

## 2023-05-10 RX ADMIN — IOHEXOL 100 ML: 350 INJECTION, SOLUTION INTRAVENOUS at 07:05

## 2023-05-10 RX ADMIN — KETOROLAC TROMETHAMINE 15 MG: 30 INJECTION, SOLUTION INTRAMUSCULAR; INTRAVENOUS at 06:05

## 2023-05-10 NOTE — ED PROVIDER NOTES
ED PROVIDER NOTE  5/10/2023    CHIEF COMPLAINT:   Chief Complaint   Patient presents with    Post-op Problem     POST OP HEMORRHOIDECTOMY W RECTAL ABSCESS FROM 23. PT W CONTINUED RECTAL, GROIN, TESTICULAR AND LOWER ABD PAIN.  DENIES FEVER.   REPORTS DYSURIA.  CO CONSTIPATION ISSUES ALSO.        HISTORY OF PRESENT ILLNESS:   Mir Conroy is a 48 y.o. male who presents with chief complaint Rectal pain.  Onset was couple of months ago whenever he began having constant pain in his rectum and groin that he states is aggravated with bowel movements.  States he has been taking stool softeners and his stools have been soft but whenever he has a bowel movement is excruciating.  Denies nausea or vomiting.    The history is provided by the patient.       REVIEW OF SYSTEMS: as noted in the HPI.  NURSING NOTES REVIEWED      PAST MEDICAL/SURGICAL HISTORY:   Past Medical History:   Diagnosis Date    Hypertension       Past Surgical History:   Procedure Laterality Date    EXAMINATION UNDER ANESTHESIA N/A 2023    Procedure: Exam under anesthesia;  Surgeon: Davin Lombardi MD;  Location: [facility];  Service: General;  Laterality: N/A;    hemarrhoids      INCISION AND DRAINAGE OF BUTTOCK N/A 2023    Procedure: INCISION AND DRAINAGE, BUTTOCK;  Surgeon: Davin Lombardi MD;  Location: [facility] OR;  Service: General;  Laterality: N/A;  gluteal abscess       FAMILY HISTORY: History reviewed. No pertinent family history.    SOCIAL HISTORY:   Social History     Tobacco Use    Smoking status: Former     Packs/day: 0.50     Years: 3.00     Pack years: 1.50     Types: Cigarettes     Quit date:      Years since quittin.3    Smokeless tobacco: Never   Substance Use Topics    Alcohol use: Never    Drug use: Yes     Frequency: 1.0 times per week     Types: Marijuana     Comment: weekly       ALLERGIES: Review of patient's allergies indicates:  No Known Allergies    PHYSICAL EXAM:  Initial Vitals [05/10/23 1700]   BP Pulse  Resp Temp SpO2   134/79 74 16 97.5 °F (36.4 °C) 100 %      MAP       --         Physical Exam    Nursing note and vitals reviewed.  Constitutional: He appears well-developed and well-nourished. No distress.   HENT:   Head: Normocephalic and atraumatic.   Nose: Nose normal.   Mouth/Throat: Oropharynx is clear and moist and mucous membranes are normal.   Eyes: Conjunctivae and EOM are normal. Pupils are equal, round, and reactive to light.   Neck: Neck supple. No tracheal deviation present.   Cardiovascular:  Normal rate, regular rhythm, normal heart sounds, intact distal pulses and normal pulses.           Pulmonary/Chest: Effort normal and breath sounds normal. No respiratory distress.   Abdominal: Abdomen is soft. There is abdominal tenderness in the suprapubic area.   Tenderness to palpation of lower abdomen, groin, and buttocks limiting exam of anus but grossly normal appearance. There is no rebound and no guarding.   Musculoskeletal:         General: Normal range of motion.      Cervical back: Neck supple.     Neurological: He is alert and oriented to person, place, and time. GCS score is 15.   CN II-XII intact. Moves all extremities. No gross sensory or motor deficits.   Skin: Skin is warm, dry and intact.   Psychiatric: He has a normal mood and affect. His speech is normal and behavior is normal. Judgment and thought content normal. Cognition and memory are normal.       RESULTS:  Labs Reviewed   COMPREHENSIVE METABOLIC PANEL - Abnormal; Notable for the following components:       Result Value    Chloride 108 (*)     Creatinine 1.22 (*)     All other components within normal limits   URINALYSIS, REFLEX TO URINE CULTURE - Abnormal; Notable for the following components:    Protein, UA Trace (*)     Ketones, UA Trace (*)     Squamous Epithelial Cells, UA Trace (*)     Mucous, UA Trace (*)     All other components within normal limits   CBC WITH DIFFERENTIAL - Abnormal; Notable for the following components:    RBC  4.68 (*)     MPV 10.6 (*)     Neut # 2.02 (*)     All other components within normal limits   CBC W/ AUTO DIFFERENTIAL    Narrative:     The following orders were created for panel order CBC auto differential.  Procedure                               Abnormality         Status                     ---------                               -----------         ------                     CBC with Differential[[id number]]        Abnormal            Final result                 Please view results for these tests on the individual orders.   EXTRA TUBES    Narrative:     The following orders were created for panel order EXTRA TUBES.  Procedure                               Abnormality         Status                     ---------                               -----------         ------                     Light Blue Top Hold[[id number]]                              In process                 Gold Top Hold[[id number]]                                    In process                   Please view results for these tests on the individual orders.   LIGHT BLUE TOP HOLD   GOLD TOP HOLD     Imaging Results              CT Abdomen Pelvis With Contrast (Final result)  Result time 05/10/23 19:26:01      Final result by Stanislav Warren MD (05/10/23 19:26:01)                   Impression:      1. Suspected perianal fistula on the right but no drainable fluid collection seen.  2. Other chronic findings above.      Electronically signed by: Stanislav Warren  Date:    05/10/2023  Time:    19:26               Narrative:    EXAMINATION:  CT ABDOMEN PELVIS WITH CONTRAST    CLINICAL HISTORY:  perianal abscess;    TECHNIQUE:  Helical acquisition through the abdomen and pelvis with IV contrast.  Three plane reconstructions were provided for review.  mGycm. Automatic exposure control, adjustment of mA/kV or iterative reconstruction technique was used to reduce radiation.    COMPARISON:  10 April 2023    FINDINGS:  The limited imaged lung bases are  clear.    Liver is mildly enlarged and there is hepatic steatosis.  No significant abnormality of the gallbladder, spleen, pancreas.  Similar left adrenal thickening.  No hydronephrosis or suspicious renal lesion.    There is no bowel obstruction.  Normal appendix.  No free air.  Similar appearance of a small paraumbilical hernia containing non compromised bowel loops.  There is some perianal inflammation with right-sided perianal fistula suspected extending from images 150-161 series 2.  No drainable fluid collection is seen.  Suspect internal hemorrhoids.    The urinary bladder is unremarkable.  No pelvic free fluid.  Abdominal aorta normal in caliber.  No retroperitoneal adenopathy.  Some inguinal lymph nodes are likely reactive.    Mild degenerative change of the spine.                                      PROCEDURES:  Procedures    ECG:       ED COURSE AND MEDICAL DECISION MAKING:  Medications   ketorolac injection 15 mg (15 mg Intravenous Given 5/10/23 1837)   iohexoL (OMNIPAQUE 350) injection 100 mL (100 mLs Intravenous Given 5/10/23 1920)           Medical Decision Making  48-year-old male who presents with continued worsening rectal pain for the past couple of months.  He was exquisite tenderness to palpation of the buttocks limiting examination but I do not appreciate any gross abnormality.  Given his history of abscess, CT was obtained to evaluate for this possibility and was read by radiologist showing some stranding and possible fistula but no fluid collection.  Findings discussed with the patient and recommend that he follow up with General surgery.  He states he was supposed to actually have an appointment today but for some reason he was told it was not scheduled and they have not called to reschedule it.  Instructed him to call them tomorrow morning to have his appointment rescheduled so that they can evaluate for this suspected fistula seen on CT.  Discussed starting bowel regimen to help with  bowel movements so that he does not have to strain to defecate.  I counseled patient on risks associated with opioid medication including, but not limited to, physical dependence and/or addiction, confusion, constipation, drowsiness, nausea or vomiting, impairment in driving and/or operating machinery. I also advised the patient that taking more opioids than prescribed or mixing with other central nervous system depressants/sedatives, such as benzodiazepines or alcohol, can result in respiratory depression, hypoxic brain injury, coma, or death.  Given strict ED return precautions. I have spoken with the patient and/or caregivers. I have explained the patient's condition, diagnoses and treatment plan based on the information available to me at this time. I have answered the patient's and/or caregiver's questions and addressed any concerns. The patient and/or caregivers have as good an understanding of the patient's diagnosis, condition and treatment plan as can be expected at this point. The vital signs have been stable. The patient's condition is stable and appropriate for discharge from the emergency department.     The patient will pursue further outpatient evaluation with the primary care physician or other designated or consulting physician as outlined in the discharge instructions. The patient and/or caregivers are agreeable to this plan of care and follow-up instructions have been explained in detail. The patient and/or caregivers have received these instructions in written format and have expressed an understanding of the discharge instructions. The patient and/or caregivers are aware that any significant change in condition or worsening of symptoms should prompt an immediate return to this or the closest emergency department or a call to 911.      Amount and/or Complexity of Data Reviewed  Labs: ordered. Decision-making details documented in ED Course.  Radiology: ordered. Decision-making details documented  in ED Course.    Risk  Prescription drug management.  Elective major surgery with identified risk factors.        CLINICAL IMPRESSION:  1. Rectal pain        DISPOSITION:   ED Disposition Condition    Discharge Stable            ED Prescriptions       Medication Sig Dispense Start Date End Date Auth. Provider    HYDROcodone-acetaminophen (NORCO) 5-325 mg per tablet Take 1 tablet by mouth every 6 (six) hours as needed for Pain. 8 tablet 5/10/2023 -- Clyde Morton DO          Follow-up Information    None            Clyde Morton DO  05/10/23 8848

## 2023-05-10 NOTE — FIRST PROVIDER EVALUATION
"Medical screening examination initiated.  I have conducted a focused provider triage encounter, findings are as follows:    Brief history of present illness:  continued anal pain since having a perianal abscess drained and hemorroidectomy done 3 weeks ago. After chart review, patient was supposed to have a EUA today for possible infection and fistula but patient states no one called him so he showed up today and the surgeon said "someone messed up the schedule." He states the surgeon called him and told him to come to the ED for evaluation. Denies fever/chills, n/v/d.     There were no vitals filed for this visit.    Pertinent physical exam:  lower abdominal tenderness to palpation    Brief workup plan:  cbc, cmp, urinalysis, ct abdomen/pelvis with contrast    Preliminary workup initiated; this workup will be continued and followed by the physician or advanced practice provider that is assigned to the patient when roomed.  "

## 2023-05-11 RX ORDER — HEPARIN SODIUM 5000 [USP'U]/ML
5000 INJECTION, SOLUTION INTRAVENOUS; SUBCUTANEOUS ONCE
Status: SHIPPED | OUTPATIENT
Start: 2023-05-19

## 2023-05-11 RX ORDER — SODIUM CHLORIDE 9 MG/ML
INJECTION, SOLUTION INTRAVENOUS CONTINUOUS
Status: CANCELLED | OUTPATIENT
Start: 2023-05-11

## 2023-05-17 ENCOUNTER — ANESTHESIA EVENT (OUTPATIENT)
Dept: SURGERY | Facility: HOSPITAL | Age: 48
End: 2023-05-17
Payer: MEDICAID

## 2023-05-17 NOTE — ANESTHESIA PREPROCEDURE EVALUATION
"                                                                                                             05/17/2023  Mir Conroy is a 48 y.o., male with PMHx of obesity, smoking presents for rectal EUA secondary to possible anal fistula.    NO BETA BLOCKER USE    Active Ambulatory Problems     Diagnosis Date Noted    No Active Ambulatory Problems     Resolved Ambulatory Problems     Diagnosis Date Noted    No Resolved Ambulatory Problems     Past Medical History:   Diagnosis Date    Hypertension        Pre-op Assessment    I have reviewed the NPO Status.      Review of Systems  Anesthesia Hx:  No problems with previous Anesthesia    Social:  Smoker    Cardiovascular:   Hypertension, well controlled    Pulmonary:  Pulmonary Normal    Renal/:  Renal/ Normal     Hepatic/GI:  Hepatic/GI Normal    Neurological:  Neurology Normal    Endocrine:  Obesity / BMI > 30    Vitals:    05/19/23 0645 05/19/23 0707 05/19/23 0754   BP: 128/81  (!) 154/101   BP Location: Right arm     Patient Position: Sitting     Pulse: 73  69   Resp: 16  20   Temp: 36.5 °C (97.7 °F)  37.1 °C (98.8 °F)   TempSrc: Oral  Axillary   SpO2: 100%  97%   Weight:  114.1 kg (251 lb 9.6 oz)    Height:  5' 7.99" (1.727 m)          Physical Exam  General: Alert, Cooperative and Well nourished    Airway:  Mallampati: II   Mouth Opening: Normal  TM Distance: Normal  Tongue: Normal  Neck ROM: Normal ROM    Chest/Lungs:  Clear to auscultation, Normal Respiratory Rate    Heart:  Rate: Normal  Rhythm: Regular Rhythm  Sounds: Normal      Lab Results   Component Value Date    WBC 4.69 05/10/2023    HGB 14.5 05/10/2023    HCT 43.5 05/10/2023    MCV 92.9 05/10/2023     05/10/2023       CMP  Sodium Level   Date Value Ref Range Status   05/10/2023 141 136 - 145 mmol/L Final     Potassium Level   Date Value Ref Range Status   05/10/2023 4.0 3.5 - 5.1 mmol/L Final     Carbon Dioxide   Date Value Ref Range Status   05/10/2023 23 22 - 29 mmol/L Final "     Blood Urea Nitrogen   Date Value Ref Range Status   05/10/2023 19.6 8.9 - 20.6 mg/dL Final     Creatinine   Date Value Ref Range Status   05/10/2023 1.22 (H) 0.73 - 1.18 mg/dL Final     Calcium Level Total   Date Value Ref Range Status   05/10/2023 9.7 8.4 - 10.2 mg/dL Final     Albumin Level   Date Value Ref Range Status   05/10/2023 4.1 3.5 - 5.0 g/dL Final     Bilirubin Total   Date Value Ref Range Status   05/10/2023 0.3 <=1.5 mg/dL Final     Alkaline Phosphatase   Date Value Ref Range Status   05/10/2023 64 40 - 150 unit/L Final     Aspartate Aminotransferase   Date Value Ref Range Status   05/10/2023 18 5 - 34 unit/L Final     Alanine Aminotransferase   Date Value Ref Range Status   05/10/2023 26 0 - 55 unit/L Final     eGFR   Date Value Ref Range Status   05/10/2023 >60 mls/min/1.73/m2 Final               Anesthesia Plan  Type of Anesthesia, risks & benefits discussed:    Anesthesia Type: Gen ETT  Intra-op Monitoring Plan: Standard ASA Monitors  Post Op Pain Control Plan: IV/PO Opioids PRN  Induction:  IV  Airway Plan: Direct  Informed Consent: Informed consent signed with the Patient and all parties understand the risks and agree with anesthesia plan.  All questions answered.   ASA Score: 2  Day of Surgery Review of History & Physical: H&P Update referred to the surgeon/provider.    Ready For Surgery From Anesthesia Perspective.     .

## 2023-05-19 ENCOUNTER — ANESTHESIA (OUTPATIENT)
Dept: SURGERY | Facility: HOSPITAL | Age: 48
End: 2023-05-19
Payer: MEDICAID

## 2023-05-19 ENCOUNTER — HOSPITAL ENCOUNTER (OUTPATIENT)
Facility: HOSPITAL | Age: 48
Discharge: HOME | End: 2023-05-19
Attending: COLON & RECTAL SURGERY | Admitting: COLON & RECTAL SURGERY
Payer: MEDICAID

## 2023-05-19 DIAGNOSIS — K60.3 ANAL FISTULA: Primary | ICD-10-CM

## 2023-05-19 DIAGNOSIS — K62.89 ANAL PAIN: ICD-10-CM

## 2023-05-19 PROCEDURE — 36000707: Performed by: COLON & RECTAL SURGERY

## 2023-05-19 PROCEDURE — 63600175 PHARM REV CODE 636 W HCPCS: Performed by: ANESTHESIOLOGY

## 2023-05-19 PROCEDURE — 25000003 PHARM REV CODE 250: Performed by: ANESTHESIOLOGY

## 2023-05-19 PROCEDURE — 25000003 PHARM REV CODE 250: Performed by: NURSE ANESTHETIST, CERTIFIED REGISTERED

## 2023-05-19 PROCEDURE — 88304 TISSUE EXAM BY PATHOLOGIST: CPT | Mod: TC | Performed by: COLON & RECTAL SURGERY

## 2023-05-19 PROCEDURE — 36000706: Performed by: COLON & RECTAL SURGERY

## 2023-05-19 PROCEDURE — 37000008 HC ANESTHESIA 1ST 15 MINUTES: Performed by: COLON & RECTAL SURGERY

## 2023-05-19 PROCEDURE — 63600175 PHARM REV CODE 636 W HCPCS: Performed by: NURSE ANESTHETIST, CERTIFIED REGISTERED

## 2023-05-19 PROCEDURE — 63600175 PHARM REV CODE 636 W HCPCS

## 2023-05-19 PROCEDURE — 00902 ANES ANORECTAL PX: CPT | Performed by: COLON & RECTAL SURGERY

## 2023-05-19 PROCEDURE — 71000015 HC POSTOP RECOV 1ST HR: Performed by: COLON & RECTAL SURGERY

## 2023-05-19 PROCEDURE — 27201423 OPTIME MED/SURG SUP & DEVICES STERILE SUPPLY: Performed by: COLON & RECTAL SURGERY

## 2023-05-19 PROCEDURE — 46275 REMOVE ANAL FIST INTER: CPT | Mod: ,,, | Performed by: COLON & RECTAL SURGERY

## 2023-05-19 PROCEDURE — D9220A PRA ANESTHESIA: Mod: ANES,,, | Performed by: ANESTHESIOLOGY

## 2023-05-19 PROCEDURE — 71000033 HC RECOVERY, INTIAL HOUR: Performed by: COLON & RECTAL SURGERY

## 2023-05-19 PROCEDURE — D9220A PRA ANESTHESIA: ICD-10-PCS | Mod: CRNA,,, | Performed by: NURSE ANESTHETIST, CERTIFIED REGISTERED

## 2023-05-19 PROCEDURE — 71000016 HC POSTOP RECOV ADDL HR: Performed by: COLON & RECTAL SURGERY

## 2023-05-19 PROCEDURE — 46275 PR REMOVAL ANAL FISTULA,INTERSPNINCTERIC: ICD-10-PCS | Mod: ,,, | Performed by: COLON & RECTAL SURGERY

## 2023-05-19 PROCEDURE — D9220A PRA ANESTHESIA: Mod: CRNA,,, | Performed by: NURSE ANESTHETIST, CERTIFIED REGISTERED

## 2023-05-19 PROCEDURE — D9220A PRA ANESTHESIA: ICD-10-PCS | Mod: ANES,,, | Performed by: ANESTHESIOLOGY

## 2023-05-19 PROCEDURE — 25000003 PHARM REV CODE 250: Performed by: COLON & RECTAL SURGERY

## 2023-05-19 PROCEDURE — 37000009 HC ANESTHESIA EA ADD 15 MINS: Performed by: COLON & RECTAL SURGERY

## 2023-05-19 RX ORDER — CEFAZOLIN SODIUM 1 G/3ML
INJECTION, POWDER, FOR SOLUTION INTRAMUSCULAR; INTRAVENOUS
Status: DISCONTINUED | OUTPATIENT
Start: 2023-05-19 | End: 2023-05-19

## 2023-05-19 RX ORDER — HEPARIN SODIUM 5000 [USP'U]/ML
INJECTION, SOLUTION INTRAVENOUS; SUBCUTANEOUS
Status: COMPLETED
Start: 2023-05-19 | End: 2023-05-19

## 2023-05-19 RX ORDER — PROPOFOL 10 MG/ML
VIAL (ML) INTRAVENOUS
Status: DISCONTINUED | OUTPATIENT
Start: 2023-05-19 | End: 2023-05-19

## 2023-05-19 RX ORDER — MEPERIDINE HYDROCHLORIDE 25 MG/ML
12.5 INJECTION INTRAMUSCULAR; INTRAVENOUS; SUBCUTANEOUS EVERY 10 MIN PRN
Status: DISCONTINUED | OUTPATIENT
Start: 2023-05-19 | End: 2023-05-19 | Stop reason: HOSPADM

## 2023-05-19 RX ORDER — SODIUM CHLORIDE, SODIUM LACTATE, POTASSIUM CHLORIDE, CALCIUM CHLORIDE 600; 310; 30; 20 MG/100ML; MG/100ML; MG/100ML; MG/100ML
INJECTION, SOLUTION INTRAVENOUS CONTINUOUS
Status: DISCONTINUED | OUTPATIENT
Start: 2023-05-19 | End: 2023-05-19 | Stop reason: HOSPADM

## 2023-05-19 RX ORDER — SENNOSIDES 8.6 MG/1
1 TABLET ORAL 2 TIMES DAILY
Qty: 60 TABLET | Refills: 11 | Status: ON HOLD | OUTPATIENT
Start: 2023-05-19 | End: 2023-10-11 | Stop reason: HOSPADM

## 2023-05-19 RX ORDER — HYDROCODONE BITARTRATE AND ACETAMINOPHEN 7.5; 325 MG/1; MG/1
1 TABLET ORAL EVERY 6 HOURS PRN
Qty: 15 TABLET | Refills: 0 | Status: SHIPPED | OUTPATIENT
Start: 2023-05-19 | End: 2023-10-03

## 2023-05-19 RX ORDER — FENTANYL CITRATE 50 UG/ML
INJECTION, SOLUTION INTRAMUSCULAR; INTRAVENOUS
Status: DISCONTINUED | OUTPATIENT
Start: 2023-05-19 | End: 2023-05-19

## 2023-05-19 RX ORDER — POLYETHYLENE GLYCOL 3350 17 G/17G
17 POWDER, FOR SOLUTION ORAL 2 TIMES DAILY
Qty: 60 EACH | Refills: 11 | Status: ON HOLD | OUTPATIENT
Start: 2023-05-19 | End: 2023-10-11 | Stop reason: SDUPTHER

## 2023-05-19 RX ORDER — LIDOCAINE HYDROCHLORIDE 20 MG/ML
INJECTION INTRAVENOUS
Status: DISCONTINUED | OUTPATIENT
Start: 2023-05-19 | End: 2023-05-19

## 2023-05-19 RX ORDER — ONDANSETRON 2 MG/ML
4 INJECTION INTRAMUSCULAR; INTRAVENOUS DAILY PRN
Status: DISCONTINUED | OUTPATIENT
Start: 2023-05-19 | End: 2023-05-19 | Stop reason: HOSPADM

## 2023-05-19 RX ORDER — OXYCODONE HYDROCHLORIDE 5 MG/1
5 TABLET ORAL
Status: DISCONTINUED | OUTPATIENT
Start: 2023-05-19 | End: 2023-05-19 | Stop reason: HOSPADM

## 2023-05-19 RX ORDER — DEXAMETHASONE SODIUM PHOSPHATE 4 MG/ML
INJECTION, SOLUTION INTRA-ARTICULAR; INTRALESIONAL; INTRAMUSCULAR; INTRAVENOUS; SOFT TISSUE
Status: DISCONTINUED | OUTPATIENT
Start: 2023-05-19 | End: 2023-05-19

## 2023-05-19 RX ORDER — CEFAZOLIN SODIUM 2 G/50ML
2 SOLUTION INTRAVENOUS
Status: DISCONTINUED | OUTPATIENT
Start: 2023-05-19 | End: 2023-05-19 | Stop reason: HOSPADM

## 2023-05-19 RX ORDER — BUPIVACAINE HYDROCHLORIDE 5 MG/ML
INJECTION, SOLUTION EPIDURAL; INTRACAUDAL
Status: DISCONTINUED | OUTPATIENT
Start: 2023-05-19 | End: 2023-05-19 | Stop reason: HOSPADM

## 2023-05-19 RX ORDER — ONDANSETRON 2 MG/ML
INJECTION INTRAMUSCULAR; INTRAVENOUS
Status: DISCONTINUED | OUTPATIENT
Start: 2023-05-19 | End: 2023-05-19

## 2023-05-19 RX ORDER — SODIUM CHLORIDE 9 MG/ML
INJECTION, SOLUTION INTRAVENOUS CONTINUOUS
Status: DISCONTINUED | OUTPATIENT
Start: 2023-05-19 | End: 2023-05-19 | Stop reason: HOSPADM

## 2023-05-19 RX ORDER — BUPIVACAINE HYDROCHLORIDE 5 MG/ML
INJECTION, SOLUTION EPIDURAL; INTRACAUDAL
Status: DISCONTINUED
Start: 2023-05-19 | End: 2023-05-19 | Stop reason: HOSPADM

## 2023-05-19 RX ORDER — MIDAZOLAM HYDROCHLORIDE 1 MG/ML
2 INJECTION INTRAMUSCULAR; INTRAVENOUS ONCE AS NEEDED
Status: COMPLETED | OUTPATIENT
Start: 2023-05-19 | End: 2023-05-19

## 2023-05-19 RX ORDER — KETOROLAC TROMETHAMINE 30 MG/ML
INJECTION, SOLUTION INTRAMUSCULAR; INTRAVENOUS
Status: DISCONTINUED | OUTPATIENT
Start: 2023-05-19 | End: 2023-05-19

## 2023-05-19 RX ORDER — ROCURONIUM BROMIDE 10 MG/ML
INJECTION, SOLUTION INTRAVENOUS
Status: DISCONTINUED | OUTPATIENT
Start: 2023-05-19 | End: 2023-05-19

## 2023-05-19 RX ORDER — SODIUM CHLORIDE 0.9 % (FLUSH) 0.9 %
10 SYRINGE (ML) INJECTION
Status: DISCONTINUED | OUTPATIENT
Start: 2023-05-19 | End: 2023-05-19 | Stop reason: HOSPADM

## 2023-05-19 RX ORDER — LABETALOL HYDROCHLORIDE 5 MG/ML
INJECTION, SOLUTION INTRAVENOUS
Status: DISCONTINUED | OUTPATIENT
Start: 2023-05-19 | End: 2023-05-19

## 2023-05-19 RX ORDER — MORPHINE SULFATE 2 MG/ML
2 INJECTION, SOLUTION INTRAMUSCULAR; INTRAVENOUS EVERY 5 MIN PRN
Status: DISCONTINUED | OUTPATIENT
Start: 2023-05-19 | End: 2023-05-19 | Stop reason: HOSPADM

## 2023-05-19 RX ADMIN — LABETALOL HYDROCHLORIDE 5 MG: 5 INJECTION INTRAVENOUS at 09:05

## 2023-05-19 RX ADMIN — HEPARIN SODIUM 5000 UNITS: 5000 INJECTION, SOLUTION INTRAVENOUS; SUBCUTANEOUS at 08:05

## 2023-05-19 RX ADMIN — PROPOFOL 200 MG: 10 INJECTION, EMULSION INTRAVENOUS at 08:05

## 2023-05-19 RX ADMIN — FENTANYL CITRATE 50 MCG: 50 INJECTION INTRAMUSCULAR; INTRAVENOUS at 08:05

## 2023-05-19 RX ADMIN — FENTANYL CITRATE 50 MCG: 50 INJECTION INTRAMUSCULAR; INTRAVENOUS at 09:05

## 2023-05-19 RX ADMIN — DEXAMETHASONE SODIUM PHOSPHATE 8 MG: 4 INJECTION, SOLUTION INTRA-ARTICULAR; INTRALESIONAL; INTRAMUSCULAR; INTRAVENOUS; SOFT TISSUE at 08:05

## 2023-05-19 RX ADMIN — PROPOFOL 100 MG: 10 INJECTION, EMULSION INTRAVENOUS at 09:05

## 2023-05-19 RX ADMIN — KETOROLAC TROMETHAMINE 30 MG: 30 INJECTION, SOLUTION INTRAMUSCULAR at 09:05

## 2023-05-19 RX ADMIN — ROCURONIUM BROMIDE 50 MG: 10 INJECTION INTRAVENOUS at 08:05

## 2023-05-19 RX ADMIN — CEFAZOLIN 2 G: 330 INJECTION, POWDER, FOR SOLUTION INTRAMUSCULAR; INTRAVENOUS at 08:05

## 2023-05-19 RX ADMIN — SODIUM CHLORIDE, POTASSIUM CHLORIDE, SODIUM LACTATE AND CALCIUM CHLORIDE: 600; 310; 30; 20 INJECTION, SOLUTION INTRAVENOUS at 07:05

## 2023-05-19 RX ADMIN — SUGAMMADEX 200 MG: 100 INJECTION, SOLUTION INTRAVENOUS at 09:05

## 2023-05-19 RX ADMIN — OXYCODONE HYDROCHLORIDE 5 MG: 5 TABLET ORAL at 10:05

## 2023-05-19 RX ADMIN — MIDAZOLAM HYDROCHLORIDE 2 MG: 1 INJECTION, SOLUTION INTRAMUSCULAR; INTRAVENOUS at 08:05

## 2023-05-19 RX ADMIN — LIDOCAINE HYDROCHLORIDE 50 MG: 20 INJECTION INTRAVENOUS at 08:05

## 2023-05-19 RX ADMIN — ONDANSETRON 4 MG: 2 INJECTION INTRAMUSCULAR; INTRAVENOUS at 08:05

## 2023-05-19 NOTE — OP NOTE
[facility] - Per[facility] Services  General Surgery  Operative Note    SUMMARY     Date of Procedure: 5/19/2023     Procedure: Intersphincteric fistulotomy    Surgeon(s) and Role:     * Maxi Alford MD - Primary     * Abhay Murphy MD - Resident - Assisting     * Dawit Mcmanus MD - Resident - Assisting    Pre-Operative Diagnosis:   Anal pain    Post-Operative Diagnosis:  Intrasphincteric anal fistula  Anal pain    Anesthesia: General    Operative Findings (including complications, if any): Fistula site at right posterior aspect of anus found to track intersphincteric with exit deep to the dentate line in the anal canal, chronically inflamed mucosa with thick scar tissue along tract    Description of Technical Procedures: After informed consent was obtained and all questions answered patient was transported into the operating room and GETA administered. Patient was then rolled onto the operating table and place in the prone laurita-knife position. Hair was clipped and patient's anus and surrounding skin were prepped and draped in standard sterile fashion. Speculum was inserted and patients anal canal examined. There was an area in the posterior aspect of the anal canal that appeared to be an old, healed anal fissure and there were several areas of fistula sites at the posterior aspect of the anus along with very dense scar tissue. These were probed with a lacrimal probe and one tract was found to exit superficial to the anal sphincter. This tract was opened using Bovie electrocautery. Further probing was performed and no deeper tracts were initially identified. Hydrogen peroxide was then injected into the fistula tracts and was visualized exiting a fistula site deeper in the anal canal. After further probing a second tract was identified and appeared to be intersphincteric tracking deep to the dentate line in the anal canal. This tract was similarly opened using electrocautery. There was significant scar tissue  surrounding the incised tracts, a portion of which we excised using electrocautery. Anal canal was then circumferentially examined and no further fistula sites were identified. Bilateral aspects of the fistula site were then marsupialized using 2-0 Vicryl in a running, interlocking fashion. Anal canal and wound were then irrigated using warm sterile saline and hemostasis achieved using electrocautery. A piece of gelfoam was packed in the anal canal followed by sterile dressings. Patient was then transferred back to the stretcher in a supine fashion and awakened from GETA. He was then transported to PACU in stable condition.     Dr. Alford was present and scrubbed for the entirety of the case.    Estimated Blood Loss (EBL): 5cc    Implants: * No implants in log *    Specimens:   Specimen (24h ago, onward)       Start     Ordered    05/19/23 0902  Specimen to Pathology  RELEASE UPON ORDERING        References:    Click here for ordering Quick Tip   Question:  Release to patient  Answer:  Immediate    05/19/23 0902                            Condition: Stable    Disposition: PACU - hemodynamically stable.    Dawit Mcmanus MD  [facility]

## 2023-05-19 NOTE — DISCHARGE INSTRUCTIONS
· [facility] follow up appointment  [facility].  Resume home medications unless otherwise instructed by your doctor.    · No heavy lifting, bending, or straining over 10 pounds till cleared by doctor.    - Can take dressing off tomorrow morning or once use the restroom.    · You may take a shower starting tomorrow evening. Wash GENTLY with soap and water (do not scrub) over incision, rinse with water, and pat dry.    · Do not soak your wound in water for two weeks. Do not take baths, swim, or use a hot tub until your doctor says it is okay.    · Use pain medication as instructed. Do not take Tylenol (acetaminophen) with your NORCO, since NORCO contains Tylenol as well.    · You may use an ice pack as needed for 20 minutes at a time over your incision site to minimize swelling and help relieve pain.    - Take Fiber for softer stools    · Do not drink alcohol or drive today, or as long as you are on pain medication.    · Notify MD of any moderate to severe pain unrelieved by pain medicine, if your incision opens and/or bleeds, or for any signs of infection including fever above 100.4, excessive redness or swelling, yellow/green foul- smelling drainage, nausea or vomiting. Clinics number is [phone]. If it is after business hours or emergency call [phone] and state Im having post op complications and need to speak to the surgeon on call.    · Thanks for choosing [facility]! Have a smooth recovery!

## 2023-05-19 NOTE — ANESTHESIA POSTPROCEDURE EVALUATION
Anesthesia Post Evaluation    Patient: Mir Conroy    Procedure(s) Performed: Procedure(s) (LRB):  EXAM UNDER ANESTHESIA, DIGITAL, RECTUM (N/A)    Final Anesthesia Type: general      Patient location during evaluation: DOSC  Post-procedure vital signs: reviewed and stable  Airway patency: patent      Anesthetic complications: no      Cardiovascular status: hemodynamically stable  Respiratory status: spontaneous ventilation  Follow-up not needed.          Vitals Value Taken Time   /82 05/19/23 1125   Temp 36.3 °C (97.3 °F) 05/19/23 1010   Pulse 70 05/19/23 1125   Resp 16 05/19/23 1125   SpO2 98 % 05/19/23 1125         Event Time   Out of Recovery 10:08:00         Pain/Kinga Score: Pain Rating Prior to Med Admin: 9 (5/19/2023 10:32 AM)  Kinga Score: 9 (5/19/2023 10:10 AM)

## 2023-05-19 NOTE — ANESTHESIA PROCEDURE NOTES
Intubation    Date/Time: 5/19/2023 8:26 AM  Performed by: Sofy Ingram CRNA  Authorized by: Melvi Huber MD     Intubation:     Induction:  Intravenous    Intubated:  Postinduction    Mask Ventilation:  Easy with oral airway    Attempts:  1    Attempted By:  CRNA    Method of Intubation:  Direct    Blade:  Henriquez 2    Laryngeal View Grade: Grade I - full view of cords      Difficult Airway Encountered?: No      Complications:  None    Airway Device:  Oral endotracheal tube    Airway Device Size:  7.5    Style/Cuff Inflation:  Cuffed (inflated to minimal occlusive pressure)    Inflation Amount (mL):  7    Tube secured:  22    Secured at:  The lips    Complicating Factors:  None    Findings Post-Intubation:  BS equal bilateral and atraumatic/condition of teeth unchanged

## 2023-05-19 NOTE — DISCHARGE SUMMARY
[facility]  Discharge Note  Short Stay    Procedure(s) (LRB):  EXAM UNDER ANESTHESIA, DIGITAL, RECTUM (N/A)    OUTCOME: Patient tolerated treatment/procedure well without complication and is now ready for discharge.    DISPOSITION: Home or Self Care    FINAL DIAGNOSIS:  Intra-sphincteric anal fistula    FOLLOWUP: In clinic    DISCHARGE INSTRUCTIONS:    Discharge Procedure Orders   Diet Adult Regular     Remove dressing in 24 hours   Order Comments: Can remove dressing and gelfoam packing in AM     Notify your health care provider if you experience any of the following:  temperature >100.4     Notify your health care provider if you experience any of the following:  persistent nausea and vomiting or diarrhea     Notify your health care provider if you experience any of the following:  severe uncontrolled pain     Notify your health care provider if you experience any of the following:  redness, tenderness, or signs of infection (pain, swelling, redness, odor or green/yellow discharge around incision site)     Activity as tolerated        TIME SPENT ON DISCHARGE: 15 minutes

## 2023-05-19 NOTE — TRANSFER OF CARE
Anesthesia Transfer of Care Note    Patient: Mir Conroy    Procedure(s) Performed: Procedure(s) (LRB):  EXAM UNDER ANESTHESIA, DIGITAL, RECTUM (N/A)    Patient location: PACU    Anesthesia Type: general    Transport from OR: Transported from OR on room air with adequate spontaneous ventilation    Post pain: adequate analgesia    Post assessment: no apparent anesthetic complications    Post vital signs: stable    Level of consciousness: sedated    Nausea/Vomiting: no nausea/vomiting    Complications: none    Transfer of care protocol was followed      Last vitals:

## 2023-05-19 NOTE — INTERVAL H&P NOTE
The patient has been examined and the H&P has been reviewed:    I concur with the findings and no changes have occurred since H&P was written.    Surgery risks, benefits and alternative options discussed and understood by patient/family.    Pt has been NPO since midnight and denies recent changes in his health. He will be taken to the OR today for an exam under anesthesia    There are no hospital problems to display for this patient.

## 2023-05-22 VITALS
DIASTOLIC BLOOD PRESSURE: 96 MMHG | TEMPERATURE: 98 F | OXYGEN SATURATION: 98 % | SYSTOLIC BLOOD PRESSURE: 153 MMHG | WEIGHT: 251.63 LBS | HEART RATE: 79 BPM | RESPIRATION RATE: 20 BRPM | HEIGHT: 68 IN | BODY MASS INDEX: 38.14 KG/M2

## 2023-05-23 LAB
ESTROGEN SERPL-MCNC: NORMAL PG/ML
INSULIN SERPL-ACNC: NORMAL U[IU]/ML
LAB AP CLINICAL INFORMATION: NORMAL
LAB AP GROSS DESCRIPTION: NORMAL
LAB AP REPORT FOOTNOTES: NORMAL
T3RU NFR SERPL: NORMAL %

## 2023-05-30 ENCOUNTER — OFFICE VISIT (OUTPATIENT)
Dept: SURGERY | Facility: CLINIC | Age: 48
End: 2023-05-30
Payer: MEDICAID

## 2023-05-30 VITALS
WEIGHT: 259.63 LBS | RESPIRATION RATE: 18 BRPM | HEART RATE: 57 BPM | DIASTOLIC BLOOD PRESSURE: 82 MMHG | OXYGEN SATURATION: 100 % | HEIGHT: 67 IN | BODY MASS INDEX: 40.75 KG/M2 | SYSTOLIC BLOOD PRESSURE: 124 MMHG

## 2023-05-30 DIAGNOSIS — R39.11 URINARY HESITANCY: Primary | ICD-10-CM

## 2023-05-30 PROCEDURE — 99214 OFFICE O/P EST MOD 30 MIN: CPT | Mod: PBBFAC

## 2023-05-30 NOTE — PROGRESS NOTES
[facility] Note    HPI:   49yo M w/ hx of intersphincteric anal fistula s/p EUA with fistulotomy on 23. Doing very well since surgery. Denies fevers, chills, n/v, abdominal pain, hematochezia, melena, diarrhea, constipation. Has had regular Bms 2-3 times per day with laxatives/stool softeners. Voiding well but has some pressure when attempting to void (has never seen urology and not on any medication for BPH)     PMH:   Past Medical History:   Diagnosis Date    Hypertension       Meds:   Current Outpatient Medications:     dicyclomine (BENTYL) 20 mg tablet, Take 1 tablet (20 mg total) by mouth every 6 (six) hours as needed (stomach cramps)., Disp: 15 tablet, Rfl: 0    HYDROcodone-acetaminophen (NORCO) 7.5-325 mg per tablet, Take 1 tablet by mouth every 6 (six) hours as needed for Pain., Disp: 15 tablet, Rfl: 0    hydrocortisone (PROCTOCORT) 10 % (80 mg) rectal foam, Place 1 applicator rectally 2 (two) times daily., Disp: 15 g, Rfl: 0    polyethylene glycol (GLYCOLAX) 17 gram PwPk, Take 17 g by mouth 2 (two) times daily., Disp: 60 each, Rfl: 11    psyllium husk, aspartame, (METAMUCIL) 3.4 gram PwPk packet, Take 1 packet by mouth 2 (two) times daily., Disp: 60 packet, Rfl: 11    senna (SENOKOT) 8.6 mg tablet, Take 1 tablet by mouth 2 (two) times daily., Disp: 60 tablet, Rfl: 11    Current Facility-Administered Medications:     heparin (porcine) injection 5,000 Units, 5,000 Units, Subcutaneous, Once, Keely Dominguez MD    Facility-Administered Medications Ordered in Other Visits:     lactated ringers infusion, , Intravenous, Continuous, Melvi Huber MD, Stopped at 23 1122  Allergies: Review of patient's allergies indicates:  No Known Allergies  Social History:   Social History     Tobacco Use    Smoking status: Former     Packs/day: 0.50     Years: 3.00     Pack years: 1.50     Types: Cigarettes     Quit date:      Years since quittin.4    Smokeless tobacco: Never   Substance  Use Topics    Alcohol use: Never    Drug use: Yes     Frequency: 1.0 times per week     Types: Marijuana     Comment: weekly     Family History: History reviewed. No pertinent family history.  Surgical History:   Past Surgical History:   Procedure Laterality Date    DIGITAL RECTAL EXAMINATION UNDER ANESTHESIA N/A 5/19/2023    Procedure: EXAM UNDER ANESTHESIA, DIGITAL, RECTUM;  Surgeon: Maxi Alford MD;  Location: [facility];  Service: General;  Laterality: N/A;  With possible fistulotomy, possible seton, possible sphincterotomy, possible hemorrhoidectomy    EXAMINATION UNDER ANESTHESIA N/A 4/21/2023    Procedure: Exam under anesthesia;  Surgeon: Davin Lombardi MD;  Location: [facility] OR;  Service: General;  Laterality: N/A;    hemarrhoids      INCISION AND DRAINAGE OF BUTTOCK N/A 4/21/2023    Procedure: INCISION AND DRAINAGE, BUTTOCK;  Surgeon: Davin Lombardi MD;  Location: [facility] OR;  Service: General;  Laterality: N/A;  gluteal abscess    RECTAL FISTULOTOMY N/A 5/19/2023    Procedure: FISTULOTOMY, RECTUM;  Surgeon: Maxi Alford MD;  Location: [facility] OR;  Service: General;  Laterality: N/A;     Review of Systems:  Skin: No rashes or itching.  Head: Denies headache or recent trauma.  Eyes: Denies eye pain or double vision.  Neck: Denies swelling or hoarseness of voice.  Respiratory: Denies shortness of breath or chest pain  Cardiac: Denies palpitations or swelling in hands/feet.  Gastrointestinal: Denies nausea, denies vomiting.   Urinary: Denies dysuria or hematuria.  Vascular: Denies claudication or leg swelling.  Neuro: Denies motor deficits. Denies weakness.  Endocrine: Denies excessive sweating or cold intolerance.  Psych: Denies memory problems. Denies anxiety.    Objective:    Vitals:  Vitals:    05/30/23 1357   BP: 124/82   Pulse: (!) 57   Resp: 18        Physical Exam:  Gen: NAD  Neuro: awake, alert, answering questions appropriately  CV: RRR  Resp: non-labored breathing, COLBY  Abd: soft, ND,  NT  Anorectal: external anal exam with well healing wounds. STEPHANIE deferred. No evidence of erythema, purulence, drainage.   Ext: moves all 4 spontaneously and purposefully  Skin: warm, well perfused      Assessment/Plan:  49yo M w/ hx of anal intersphincteric fistula s/p fistulotomy x2 5/19/23      - doing well. Counseled patient on perineal care and keeping the area as clean and dry as possible. Also continue to maintain soft, daily, formed Bms   - RTC PRN   - Urology clinic referral placed     Antoinette Ayala MD   [employer] General Surgery PGY 3  05/30/2023 2:38 PM

## 2023-06-01 NOTE — PROGRESS NOTES
I have reviewed the notes, assessments, and/or procedures performed by the resident, I concur with her/his documentation of Mir Conroy.     Nuha Riggs MD

## 2023-10-03 ENCOUNTER — OFFICE VISIT (OUTPATIENT)
Dept: SURGERY | Facility: CLINIC | Age: 48
End: 2023-10-03
Payer: MEDICAID

## 2023-10-03 VITALS
DIASTOLIC BLOOD PRESSURE: 71 MMHG | HEIGHT: 67 IN | WEIGHT: 255 LBS | TEMPERATURE: 98 F | HEART RATE: 70 BPM | BODY MASS INDEX: 40.02 KG/M2 | SYSTOLIC BLOOD PRESSURE: 109 MMHG | OXYGEN SATURATION: 99 % | RESPIRATION RATE: 19 BRPM

## 2023-10-03 DIAGNOSIS — K60.3 FISTULA-IN-ANO: Primary | ICD-10-CM

## 2023-10-03 PROCEDURE — 99215 OFFICE O/P EST HI 40 MIN: CPT | Mod: PBBFAC

## 2023-10-03 RX ORDER — SODIUM CHLORIDE 9 MG/ML
INJECTION, SOLUTION INTRAVENOUS CONTINUOUS
Status: CANCELLED | OUTPATIENT
Start: 2023-10-03

## 2023-10-03 RX ORDER — CEFAZOLIN SODIUM 2 G/50ML
2 SOLUTION INTRAVENOUS
Status: CANCELLED | OUTPATIENT
Start: 2023-10-03

## 2023-10-03 RX ORDER — GABAPENTIN 300 MG/1
300 CAPSULE ORAL 3 TIMES DAILY
Qty: 90 CAPSULE | Refills: 11 | Status: SHIPPED | OUTPATIENT
Start: 2023-10-03 | End: 2024-10-02

## 2023-10-03 RX ORDER — METHOCARBAMOL 500 MG/1
500 TABLET, FILM COATED ORAL 4 TIMES DAILY
Qty: 88 TABLET | Refills: 0 | Status: SHIPPED | OUTPATIENT
Start: 2023-10-03 | End: 2023-10-25

## 2023-10-03 RX ORDER — HYDROCODONE BITARTRATE AND ACETAMINOPHEN 7.5; 325 MG/1; MG/1
1 TABLET ORAL EVERY 6 HOURS PRN
Qty: 20 TABLET | Refills: 0 | Status: ON HOLD | OUTPATIENT
Start: 2023-10-03 | End: 2023-10-11 | Stop reason: SDUPTHER

## 2023-10-03 NOTE — PROGRESS NOTES
[facility] Note    HPI:   49yo M w/ hx of intersphincteric anal fistula s/p EUA with fistulotomy on 5/19/23. Doing very well since surgery. Denies fevers, chills, n/v, abdominal pain, hematochezia, melena, diarrhea, constipation. Has had regular Bms 2-3 times per day with laxatives/stool softeners. Voiding well but has some pressure when attempting to void (has never seen urology and not on any medication for BPH)     10/3/23: Returns today with worsening perineal pain and pressure. He says after about 1 week post-op he felt amazing, without any pain and no issues with BM. He did very well for several months but over the last few weeks he has been having anorectal pain and pressure similar to what he previously had. He now has to soak in hot water after every BM, constantly has to squat to relieve some of the pressure. He has noticed blood in the toilet once. Denies any drainage. No fevers, chills, n/v/d. He reports he is miserable due to the pain. He has been using 5-6 stool softener tablets daily to try to soften his stool but he is still sometimes constipated.     PMH:   Past Medical History:   Diagnosis Date    Hypertension       Meds:   Current Outpatient Medications:     dicyclomine (BENTYL) 20 mg tablet, Take 1 tablet (20 mg total) by mouth every 6 (six) hours as needed (stomach cramps). (Patient not taking: Reported on 10/3/2023), Disp: 15 tablet, Rfl: 0    gabapentin (NEURONTIN) 300 MG capsule, Take 1 capsule (300 mg total) by mouth 3 (three) times daily., Disp: 90 capsule, Rfl: 11    HYDROcodone-acetaminophen (NORCO) 7.5-325 mg per tablet, Take 1 tablet by mouth every 6 (six) hours as needed for Pain., Disp: 20 tablet, Rfl: 0    hydrocortisone (PROCTOCORT) 10 % (80 mg) rectal foam, Place 1 applicator rectally 2 (two) times daily. (Patient not taking: Reported on 10/3/2023), Disp: 15 g, Rfl: 0    methocarbamoL (ROBAXIN) 500 MG Tab, Take 1 tablet (500 mg total) by mouth 4 (four) times daily.  for 22 days, Disp: 88 tablet, Rfl: 0    polyethylene glycol (GLYCOLAX) 17 gram PwPk, Take 17 g by mouth 2 (two) times daily. (Patient not taking: Reported on 10/3/2023), Disp: 60 each, Rfl: 11    psyllium husk, aspartame, (METAMUCIL) 3.4 gram PwPk packet, Take 1 packet by mouth 2 (two) times daily. (Patient not taking: Reported on 10/3/2023), Disp: 60 packet, Rfl: 11    senna (SENOKOT) 8.6 mg tablet, Take 1 tablet by mouth 2 (two) times daily. (Patient not taking: Reported on 10/3/2023), Disp: 60 tablet, Rfl: 11    Current Facility-Administered Medications:     heparin (porcine) injection 5,000 Units, 5,000 Units, Subcutaneous, Once, Keely Dominguez MD    Facility-Administered Medications Ordered in Other Visits:     lactated ringers infusion, , Intravenous, Continuous, Melvi Huber MD, Stopped at 23 1122  Allergies: Review of patient's allergies indicates:  No Known Allergies  Social History:   Social History     Tobacco Use    Smoking status: Some Days     Current packs/day: 0.00     Average packs/day: 0.5 packs/day for 3.0 years (1.5 ttl pk-yrs)     Types: Cigarettes     Start date:      Last attempt to quit:      Years since quittin.7    Smokeless tobacco: Never    Tobacco comments:     Less than half.   Substance Use Topics    Alcohol use: Not Currently    Drug use: Yes     Frequency: 1.0 times per week     Types: Marijuana     Comment: weekly     Family History: History reviewed. No pertinent family history.  Surgical History:   Past Surgical History:   Procedure Laterality Date    DIGITAL RECTAL EXAMINATION UNDER ANESTHESIA N/A 2023    Procedure: EXAM UNDER ANESTHESIA, DIGITAL, RECTUM;  Surgeon: Maxi Alford MD;  Location: [facility];  Service: General;  Laterality: N/A;  With possible fistulotomy, possible seton, possible sphincterotomy, possible hemorrhoidectomy    EXAMINATION UNDER ANESTHESIA N/A 2023    Procedure: Exam under anesthesia;  Surgeon: Davin RIVERA  MD Harjit;  Location: [facility];  Service: General;  Laterality: N/A;    hemarrhoids      INCISION AND DRAINAGE OF BUTTOCK N/A 04/21/2023    Procedure: INCISION AND DRAINAGE, BUTTOCK;  Surgeon: Davin Lombardi MD;  Location: [facility] OR;  Service: General;  Laterality: N/A;  gluteal abscess    RECTAL FISTULOTOMY N/A 05/19/2023    Procedure: FISTULOTOMY, RECTUM;  Surgeon: Maxi Alofrd MD;  Location: [facility] OR;  Service: General;  Laterality: N/A;     Review of Systems:  Skin: No rashes or itching.  Head: Denies headache or recent trauma.  Eyes: Denies eye pain or double vision.  Neck: Denies swelling or hoarseness of voice.  Respiratory: Denies shortness of breath or chest pain  Cardiac: Denies palpitations or swelling in hands/feet.  Gastrointestinal: Denies nausea, denies vomiting.   Urinary: Denies dysuria or hematuria.  Vascular: Denies claudication or leg swelling.  Neuro: Denies motor deficits. Denies weakness.  Endocrine: Denies excessive sweating or cold intolerance.  Psych: Denies memory problems. Denies anxiety.    Objective:    Vitals:  Vitals:    10/03/23 1225   BP: 109/71   Pulse: 70   Resp: 19   Temp: 98.1 °F (36.7 °C)        Physical Exam:  Gen: NAD  Neuro: awake, alert, answering questions appropriately  CV: RRR  Resp: non-labored breathing, COLBY  Abd: soft, ND, NT  Anorectal: external anal exam without any apparent pathology; patient with exquisite TTP that limits STEPHANIE; no palpable areas of fluctuance, no erythema, induration  Ext: moves all 4 spontaneously and purposefully  Skin: warm, well perfused      Assessment/Plan:  49yo M w/ hx of anal intersphincteric fistula s/p fistulotomy x2 5/19/23, reports he did well for several months but has had worsening pain over the last few weeks.    - Booked and consented for EUA 10/25  - Prescribed multimodal pain control until then  - Recommended continued healthy bowel habits  - Instructed him to go to the ER if he experiences intractable pain, fevers,  purulent drainage, or other signs of infection/abscess    Dawit Mcmanus MD   [employer] General Surgery PGY 1  10/03/2023 2:38 PM

## 2023-10-03 NOTE — H&P (VIEW-ONLY)
[facility] Note    HPI:   49yo M w/ hx of intersphincteric anal fistula s/p EUA with fistulotomy on 5/19/23. Doing very well since surgery. Denies fevers, chills, n/v, abdominal pain, hematochezia, melena, diarrhea, constipation. Has had regular Bms 2-3 times per day with laxatives/stool softeners. Voiding well but has some pressure when attempting to void (has never seen urology and not on any medication for BPH)     10/3/23: Returns today with worsening perineal pain and pressure. He says after about 1 week post-op he felt amazing, without any pain and no issues with BM. He did very well for several months but over the last few weeks he has been having anorectal pain and pressure similar to what he previously had. He now has to soak in hot water after every BM, constantly has to squat to relieve some of the pressure. He has noticed blood in the toilet once. Denies any drainage. No fevers, chills, n/v/d. He reports he is miserable due to the pain. He has been using 5-6 stool softener tablets daily to try to soften his stool but he is still sometimes constipated.     PMH:   Past Medical History:   Diagnosis Date    Hypertension       Meds:   Current Outpatient Medications:     dicyclomine (BENTYL) 20 mg tablet, Take 1 tablet (20 mg total) by mouth every 6 (six) hours as needed (stomach cramps). (Patient not taking: Reported on 10/3/2023), Disp: 15 tablet, Rfl: 0    gabapentin (NEURONTIN) 300 MG capsule, Take 1 capsule (300 mg total) by mouth 3 (three) times daily., Disp: 90 capsule, Rfl: 11    HYDROcodone-acetaminophen (NORCO) 7.5-325 mg per tablet, Take 1 tablet by mouth every 6 (six) hours as needed for Pain., Disp: 20 tablet, Rfl: 0    hydrocortisone (PROCTOCORT) 10 % (80 mg) rectal foam, Place 1 applicator rectally 2 (two) times daily. (Patient not taking: Reported on 10/3/2023), Disp: 15 g, Rfl: 0    methocarbamoL (ROBAXIN) 500 MG Tab, Take 1 tablet (500 mg total) by mouth 4 (four) times daily.  for 22 days, Disp: 88 tablet, Rfl: 0    polyethylene glycol (GLYCOLAX) 17 gram PwPk, Take 17 g by mouth 2 (two) times daily. (Patient not taking: Reported on 10/3/2023), Disp: 60 each, Rfl: 11    psyllium husk, aspartame, (METAMUCIL) 3.4 gram PwPk packet, Take 1 packet by mouth 2 (two) times daily. (Patient not taking: Reported on 10/3/2023), Disp: 60 packet, Rfl: 11    senna (SENOKOT) 8.6 mg tablet, Take 1 tablet by mouth 2 (two) times daily. (Patient not taking: Reported on 10/3/2023), Disp: 60 tablet, Rfl: 11    Current Facility-Administered Medications:     heparin (porcine) injection 5,000 Units, 5,000 Units, Subcutaneous, Once, Keely Dominguez MD    Facility-Administered Medications Ordered in Other Visits:     lactated ringers infusion, , Intravenous, Continuous, Mlevi Huber MD, Stopped at 23 1122  Allergies: Review of patient's allergies indicates:  No Known Allergies  Social History:   Social History     Tobacco Use    Smoking status: Some Days     Current packs/day: 0.00     Average packs/day: 0.5 packs/day for 3.0 years (1.5 ttl pk-yrs)     Types: Cigarettes     Start date:      Last attempt to quit:      Years since quittin.7    Smokeless tobacco: Never    Tobacco comments:     Less than half.   Substance Use Topics    Alcohol use: Not Currently    Drug use: Yes     Frequency: 1.0 times per week     Types: Marijuana     Comment: weekly     Family History: History reviewed. No pertinent family history.  Surgical History:   Past Surgical History:   Procedure Laterality Date    DIGITAL RECTAL EXAMINATION UNDER ANESTHESIA N/A 2023    Procedure: EXAM UNDER ANESTHESIA, DIGITAL, RECTUM;  Surgeon: Maxi Alford MD;  Location: [facility];  Service: General;  Laterality: N/A;  With possible fistulotomy, possible seton, possible sphincterotomy, possible hemorrhoidectomy    EXAMINATION UNDER ANESTHESIA N/A 2023    Procedure: Exam under anesthesia;  Surgeon: Davin RIVERA  MD Harjit;  Location: [facility];  Service: General;  Laterality: N/A;    hemarrhoids      INCISION AND DRAINAGE OF BUTTOCK N/A 04/21/2023    Procedure: INCISION AND DRAINAGE, BUTTOCK;  Surgeon: Davin Lombardi MD;  Location: [facility] OR;  Service: General;  Laterality: N/A;  gluteal abscess    RECTAL FISTULOTOMY N/A 05/19/2023    Procedure: FISTULOTOMY, RECTUM;  Surgeon: Maxi Alford MD;  Location: [facility] OR;  Service: General;  Laterality: N/A;     Review of Systems:  Skin: No rashes or itching.  Head: Denies headache or recent trauma.  Eyes: Denies eye pain or double vision.  Neck: Denies swelling or hoarseness of voice.  Respiratory: Denies shortness of breath or chest pain  Cardiac: Denies palpitations or swelling in hands/feet.  Gastrointestinal: Denies nausea, denies vomiting.   Urinary: Denies dysuria or hematuria.  Vascular: Denies claudication or leg swelling.  Neuro: Denies motor deficits. Denies weakness.  Endocrine: Denies excessive sweating or cold intolerance.  Psych: Denies memory problems. Denies anxiety.    Objective:    Vitals:  Vitals:    10/03/23 1225   BP: 109/71   Pulse: 70   Resp: 19   Temp: 98.1 °F (36.7 °C)        Physical Exam:  Gen: NAD  Neuro: awake, alert, answering questions appropriately  CV: RRR  Resp: non-labored breathing, COLBY  Abd: soft, ND, NT  Anorectal: external anal exam without any apparent pathology; patient with exquisite TTP that limits STEPHANIE; no palpable areas of fluctuance, no erythema, induration  Ext: moves all 4 spontaneously and purposefully  Skin: warm, well perfused      Assessment/Plan:  49yo M w/ hx of anal intersphincteric fistula s/p fistulotomy x2 5/19/23, reports he did well for several months but has had worsening pain over the last few weeks.    - Booked and consented for EUA 10/25  - Prescribed multimodal pain control until then  - Recommended continued healthy bowel habits  - Instructed him to go to the ER if he experiences intractable pain, fevers,  purulent drainage, or other signs of infection/abscess    Dawit Mcmanus MD   [employer] General Surgery PGY 1  10/03/2023 2:38 PM

## 2023-10-03 NOTE — PROGRESS NOTES
Placed in room. Seen by Dr. Dawit Mcmanus. Spoke with patient. Surgery date is 10/25/2023. RTC in 2 weeks.

## 2023-10-10 ENCOUNTER — ANESTHESIA EVENT (OUTPATIENT)
Dept: SURGERY | Facility: HOSPITAL | Age: 48
End: 2023-10-10
Payer: MEDICAID

## 2023-10-10 NOTE — ANESTHESIA PREPROCEDURE EVALUATION
10/10/2023  Mir Conroy is a 48 y.o., male .EXAM UNDER ANESTHESIA, DIGITAL, RECTUM    hx of intersphincteric anal fistula s/p EUA with fistulotomy on 5/19/23, Smoker, Obesity    Active Ambulatory Problems     Diagnosis Date Noted    Anal pain 05/19/2023     Resolved Ambulatory Problems     Diagnosis Date Noted    No Resolved Ambulatory Problems     No Additional Past Medical History           Pre-op Assessment    I have reviewed the NPO Status.      Review of Systems  Anesthesia Hx:  No problems with previous Anesthesia  History of prior surgery of interest to airway management or planning: Denies Family Hx of Anesthesia complications.   Denies Personal Hx of Anesthesia complications.   Social:  Smoker    Cardiovascular:  Cardiovascular Normal     Pulmonary:  Pulmonary Normal    Renal/:  Renal/ Normal     Hepatic/GI:  Hepatic/GI Normal    Neurological:  Neurology Normal    Endocrine:  Obesity / BMI > 30    Vitals:    10/11/23 0559 10/11/23 0602 10/11/23 0632   BP:  (!) 161/100 (!) 169/115   Pulse:  75 72   Resp:   20   Temp:  36.4 °C (97.5 °F) 36.3 °C (97.3 °F)   TempSrc:  Oral Temporal   SpO2:  99% 100%   Weight: 114.6 kg (252 lb 9.6 oz)           Physical Exam  General: Cooperative, Alert and Well nourished    Airway:  Mallampati: II   Mouth Opening: Normal  TM Distance: Normal  Tongue: Normal  Neck ROM: Normal ROM    Dental:  Intact    Chest/Lungs:  Clear to auscultation, Normal Respiratory Rate    Heart:  Rate: Normal  Rhythm: Regular Rhythm  Sounds: Normal      Lab Results   Component Value Date    WBC 4.69 05/10/2023    HGB 14.5 05/10/2023    HCT 43.5 05/10/2023    MCV 92.9 05/10/2023     05/10/2023       CMP  Sodium Level   Date Value Ref Range Status   05/10/2023 141 136 - 145 mmol/L Final     Potassium Level   Date Value Ref Range Status   05/10/2023 4.0 3.5 - 5.1 mmol/L Final      Carbon Dioxide   Date Value Ref Range Status   05/10/2023 23 22 - 29 mmol/L Final     Blood Urea Nitrogen   Date Value Ref Range Status   05/10/2023 19.6 8.9 - 20.6 mg/dL Final     Creatinine   Date Value Ref Range Status   05/10/2023 1.22 (H) 0.73 - 1.18 mg/dL Final     Calcium Level Total   Date Value Ref Range Status   05/10/2023 9.7 8.4 - 10.2 mg/dL Final     Albumin Level   Date Value Ref Range Status   05/10/2023 4.1 3.5 - 5.0 g/dL Final     Bilirubin Total   Date Value Ref Range Status   05/10/2023 0.3 <=1.5 mg/dL Final     Alkaline Phosphatase   Date Value Ref Range Status   05/10/2023 64 40 - 150 unit/L Final     Aspartate Aminotransferase   Date Value Ref Range Status   05/10/2023 18 5 - 34 unit/L Final     Alanine Aminotransferase   Date Value Ref Range Status   05/10/2023 26 0 - 55 unit/L Final     eGFR   Date Value Ref Range Status   05/10/2023 >60 mls/min/1.73/m2 Final         Anesthesia Plan  Type of Anesthesia, risks & benefits discussed:    Anesthesia Type: Gen ETT  Intra-op Monitoring Plan: Standard ASA Monitors  Post Op Pain Control Plan: IV/PO Opioids PRN  Induction:  IV  Airway Plan: Direct  Informed Consent: Informed consent signed with the Patient and all parties understand the risks and agree with anesthesia plan.  All questions answered. Patient consented to blood products? No  ASA Score: 2  Day of Surgery Review of History & Physical: H&P Update referred to the surgeon/provider.    Ready For Surgery From Anesthesia Perspective.     .

## 2023-10-11 ENCOUNTER — ANESTHESIA (OUTPATIENT)
Dept: SURGERY | Facility: HOSPITAL | Age: 48
End: 2023-10-11
Payer: MEDICAID

## 2023-10-11 ENCOUNTER — HOSPITAL ENCOUNTER (OUTPATIENT)
Facility: HOSPITAL | Age: 48
Discharge: HOME | End: 2023-10-11
Attending: COLON & RECTAL SURGERY | Admitting: COLON & RECTAL SURGERY
Payer: MEDICAID

## 2023-10-11 DIAGNOSIS — K60.3 FISTULA-IN-ANO: ICD-10-CM

## 2023-10-11 DIAGNOSIS — K60.2 ANAL FISSURE: Primary | ICD-10-CM

## 2023-10-11 PROCEDURE — D9220A PRA ANESTHESIA: ICD-10-PCS | Mod: ANES,,, | Performed by: ANESTHESIOLOGY

## 2023-10-11 PROCEDURE — D9220A PRA ANESTHESIA: ICD-10-PCS | Mod: CRNA,,, | Performed by: NURSE ANESTHETIST, CERTIFIED REGISTERED

## 2023-10-11 PROCEDURE — 71000016 HC POSTOP RECOV ADDL HR: Performed by: COLON & RECTAL SURGERY

## 2023-10-11 PROCEDURE — 46200 REMOVAL OF ANAL FISSURE: CPT | Mod: ,,, | Performed by: COLON & RECTAL SURGERY

## 2023-10-11 PROCEDURE — 37000009 HC ANESTHESIA EA ADD 15 MINS: Performed by: COLON & RECTAL SURGERY

## 2023-10-11 PROCEDURE — D9220A PRA ANESTHESIA: Mod: ANES,,, | Performed by: ANESTHESIOLOGY

## 2023-10-11 PROCEDURE — 36000706: Performed by: COLON & RECTAL SURGERY

## 2023-10-11 PROCEDURE — 37000008 HC ANESTHESIA 1ST 15 MINUTES: Performed by: COLON & RECTAL SURGERY

## 2023-10-11 PROCEDURE — 46200 PR REMOVAL OF ANAL FISSURE: ICD-10-PCS | Mod: ,,, | Performed by: COLON & RECTAL SURGERY

## 2023-10-11 PROCEDURE — 25000003 PHARM REV CODE 250: Performed by: SPECIALIST

## 2023-10-11 PROCEDURE — 25000003 PHARM REV CODE 250: Performed by: NURSE ANESTHETIST, CERTIFIED REGISTERED

## 2023-10-11 PROCEDURE — 63600175 PHARM REV CODE 636 W HCPCS: Performed by: NURSE ANESTHETIST, CERTIFIED REGISTERED

## 2023-10-11 PROCEDURE — 63600175 PHARM REV CODE 636 W HCPCS: Performed by: SPECIALIST

## 2023-10-11 PROCEDURE — 36000707: Performed by: COLON & RECTAL SURGERY

## 2023-10-11 PROCEDURE — 71000033 HC RECOVERY, INTIAL HOUR: Performed by: COLON & RECTAL SURGERY

## 2023-10-11 PROCEDURE — 71000015 HC POSTOP RECOV 1ST HR: Performed by: COLON & RECTAL SURGERY

## 2023-10-11 PROCEDURE — D9220A PRA ANESTHESIA: Mod: CRNA,,, | Performed by: NURSE ANESTHETIST, CERTIFIED REGISTERED

## 2023-10-11 PROCEDURE — 63600175 PHARM REV CODE 636 W HCPCS: Performed by: COLON & RECTAL SURGERY

## 2023-10-11 RX ORDER — GLYCOPYRROLATE 0.2 MG/ML
INJECTION INTRAMUSCULAR; INTRAVENOUS
Status: DISCONTINUED | OUTPATIENT
Start: 2023-10-11 | End: 2023-10-11

## 2023-10-11 RX ORDER — DEXAMETHASONE SODIUM PHOSPHATE 4 MG/ML
INJECTION, SOLUTION INTRA-ARTICULAR; INTRALESIONAL; INTRAMUSCULAR; INTRAVENOUS; SOFT TISSUE
Status: DISCONTINUED | OUTPATIENT
Start: 2023-10-11 | End: 2023-10-11

## 2023-10-11 RX ORDER — HYDROMORPHONE HYDROCHLORIDE 1 MG/ML
0.2 INJECTION, SOLUTION INTRAMUSCULAR; INTRAVENOUS; SUBCUTANEOUS EVERY 5 MIN PRN
Status: ACTIVE | OUTPATIENT
Start: 2023-10-11

## 2023-10-11 RX ORDER — MEPERIDINE HYDROCHLORIDE 25 MG/ML
12.5 INJECTION INTRAMUSCULAR; INTRAVENOUS; SUBCUTANEOUS ONCE
Status: ACTIVE | OUTPATIENT
Start: 2023-10-11 | End: 2023-10-12

## 2023-10-11 RX ORDER — POLYETHYLENE GLYCOL 3350 17 G/17G
0.4 POWDER, FOR SOLUTION ORAL 3 TIMES DAILY
Qty: 2922.5 EACH | Refills: 0 | Status: SHIPPED | OUTPATIENT
Start: 2023-10-11 | End: 2024-10-09

## 2023-10-11 RX ORDER — ONDANSETRON 2 MG/ML
INJECTION INTRAMUSCULAR; INTRAVENOUS
Status: DISCONTINUED | OUTPATIENT
Start: 2023-10-11 | End: 2023-10-11

## 2023-10-11 RX ORDER — BUPIVACAINE HYDROCHLORIDE 2.5 MG/ML
INJECTION, SOLUTION EPIDURAL; INFILTRATION; INTRACAUDAL
Status: DISCONTINUED | OUTPATIENT
Start: 2023-10-11 | End: 2023-10-11 | Stop reason: HOSPADM

## 2023-10-11 RX ORDER — FENTANYL CITRATE 50 UG/ML
INJECTION, SOLUTION INTRAMUSCULAR; INTRAVENOUS
Status: DISCONTINUED | OUTPATIENT
Start: 2023-10-11 | End: 2023-10-11

## 2023-10-11 RX ORDER — PROPOFOL 10 MG/ML
VIAL (ML) INTRAVENOUS
Status: DISCONTINUED | OUTPATIENT
Start: 2023-10-11 | End: 2023-10-11

## 2023-10-11 RX ORDER — ROCURONIUM BROMIDE 10 MG/ML
INJECTION, SOLUTION INTRAVENOUS
Status: DISCONTINUED | OUTPATIENT
Start: 2023-10-11 | End: 2023-10-11

## 2023-10-11 RX ORDER — CEFAZOLIN SODIUM 1 G/3ML
2 INJECTION, POWDER, FOR SOLUTION INTRAMUSCULAR; INTRAVENOUS
Status: DISCONTINUED | OUTPATIENT
Start: 2023-10-11 | End: 2023-10-11 | Stop reason: HOSPADM

## 2023-10-11 RX ORDER — IPRATROPIUM BROMIDE AND ALBUTEROL SULFATE 2.5; .5 MG/3ML; MG/3ML
3 SOLUTION RESPIRATORY (INHALATION) ONCE AS NEEDED
Status: ACTIVE | OUTPATIENT
Start: 2023-10-11 | End: 2035-03-08

## 2023-10-11 RX ORDER — SODIUM CHLORIDE 9 MG/ML
INJECTION, SOLUTION INTRAVENOUS CONTINUOUS
Status: DISCONTINUED | OUTPATIENT
Start: 2023-10-11 | End: 2023-10-11 | Stop reason: HOSPADM

## 2023-10-11 RX ORDER — LIDOCAINE HYDROCHLORIDE 20 MG/ML
INJECTION INTRAVENOUS
Status: DISCONTINUED | OUTPATIENT
Start: 2023-10-11 | End: 2023-10-11

## 2023-10-11 RX ORDER — HYDROCODONE BITARTRATE AND ACETAMINOPHEN 7.5; 325 MG/1; MG/1
1 TABLET ORAL EVERY 6 HOURS PRN
Qty: 20 TABLET | Refills: 0 | Status: SHIPPED | OUTPATIENT
Start: 2023-10-11

## 2023-10-11 RX ORDER — AMOXICILLIN 250 MG
2 CAPSULE ORAL 2 TIMES DAILY
Qty: 60 TABLET | Refills: 11 | Status: SHIPPED | OUTPATIENT
Start: 2023-10-11

## 2023-10-11 RX ORDER — KETAMINE HCL IN 0.9 % NACL 50 MG/5 ML
SYRINGE (ML) INTRAVENOUS
Status: DISCONTINUED | OUTPATIENT
Start: 2023-10-11 | End: 2023-10-11

## 2023-10-11 RX ORDER — LABETALOL HYDROCHLORIDE 5 MG/ML
INJECTION, SOLUTION INTRAVENOUS
Status: DISCONTINUED | OUTPATIENT
Start: 2023-10-11 | End: 2023-10-11

## 2023-10-11 RX ORDER — ONDANSETRON 2 MG/ML
4 INJECTION INTRAMUSCULAR; INTRAVENOUS ONCE
Status: ACTIVE | OUTPATIENT
Start: 2023-10-11

## 2023-10-11 RX ORDER — SODIUM CHLORIDE, SODIUM LACTATE, POTASSIUM CHLORIDE, CALCIUM CHLORIDE 600; 310; 30; 20 MG/100ML; MG/100ML; MG/100ML; MG/100ML
INJECTION, SOLUTION INTRAVENOUS CONTINUOUS
Status: ACTIVE | OUTPATIENT
Start: 2023-10-11

## 2023-10-11 RX ORDER — DEXMEDETOMIDINE HYDROCHLORIDE 100 UG/ML
INJECTION, SOLUTION INTRAVENOUS
Status: DISCONTINUED | OUTPATIENT
Start: 2023-10-11 | End: 2023-10-11

## 2023-10-11 RX ORDER — KETOROLAC TROMETHAMINE 30 MG/ML
INJECTION, SOLUTION INTRAMUSCULAR; INTRAVENOUS
Status: DISCONTINUED | OUTPATIENT
Start: 2023-10-11 | End: 2023-10-11

## 2023-10-11 RX ORDER — MIDAZOLAM HYDROCHLORIDE 1 MG/ML
2 INJECTION INTRAMUSCULAR; INTRAVENOUS ONCE
Status: COMPLETED | OUTPATIENT
Start: 2023-10-11 | End: 2023-10-11

## 2023-10-11 RX ORDER — PROCHLORPERAZINE EDISYLATE 5 MG/ML
5 INJECTION INTRAMUSCULAR; INTRAVENOUS ONCE AS NEEDED
Status: ACTIVE | OUTPATIENT
Start: 2023-10-11 | End: 2035-03-08

## 2023-10-11 RX ORDER — HYDROMORPHONE HYDROCHLORIDE 1 MG/ML
0.5 INJECTION, SOLUTION INTRAMUSCULAR; INTRAVENOUS; SUBCUTANEOUS EVERY 5 MIN PRN
Status: DISPENSED | OUTPATIENT
Start: 2023-10-11

## 2023-10-11 RX ORDER — DIPHENHYDRAMINE HYDROCHLORIDE 50 MG/ML
25 INJECTION INTRAMUSCULAR; INTRAVENOUS ONCE AS NEEDED
Status: ACTIVE | OUTPATIENT
Start: 2023-10-11 | End: 2035-03-08

## 2023-10-11 RX ORDER — NEOSTIGMINE METHYLSULFATE 1 MG/ML
INJECTION, SOLUTION INTRAVENOUS
Status: DISCONTINUED | OUTPATIENT
Start: 2023-10-11 | End: 2023-10-11

## 2023-10-11 RX ORDER — CEFAZOLIN SODIUM 1 G/3ML
INJECTION, POWDER, FOR SOLUTION INTRAMUSCULAR; INTRAVENOUS
Status: DISCONTINUED | OUTPATIENT
Start: 2023-10-11 | End: 2023-10-11

## 2023-10-11 RX ORDER — OXYCODONE AND ACETAMINOPHEN 5; 325 MG/1; MG/1
2 TABLET ORAL ONCE
Status: COMPLETED | OUTPATIENT
Start: 2023-10-11 | End: 2023-10-11

## 2023-10-11 RX ADMIN — LABETALOL HYDROCHLORIDE 5 MG: 5 INJECTION INTRAVENOUS at 07:10

## 2023-10-11 RX ADMIN — HYDROMORPHONE HYDROCHLORIDE 0.5 MG: 0.5 INJECTION, SOLUTION INTRAMUSCULAR; INTRAVENOUS; SUBCUTANEOUS at 09:10

## 2023-10-11 RX ADMIN — FENTANYL CITRATE 25 MCG: 50 INJECTION INTRAMUSCULAR; INTRAVENOUS at 08:10

## 2023-10-11 RX ADMIN — SODIUM CHLORIDE, POTASSIUM CHLORIDE, SODIUM LACTATE AND CALCIUM CHLORIDE: 600; 310; 30; 20 INJECTION, SOLUTION INTRAVENOUS at 08:10

## 2023-10-11 RX ADMIN — LIDOCAINE HYDROCHLORIDE 50 MG: 20 INJECTION INTRAVENOUS at 07:10

## 2023-10-11 RX ADMIN — Medication 25 MG: at 07:10

## 2023-10-11 RX ADMIN — FENTANYL CITRATE 25 MCG: 50 INJECTION INTRAMUSCULAR; INTRAVENOUS at 07:10

## 2023-10-11 RX ADMIN — PROPOFOL 200 MG: 10 INJECTION, EMULSION INTRAVENOUS at 07:10

## 2023-10-11 RX ADMIN — LABETALOL HYDROCHLORIDE 2.5 MG: 5 INJECTION INTRAVENOUS at 07:10

## 2023-10-11 RX ADMIN — ROCURONIUM BROMIDE 50 MG: 10 INJECTION INTRAVENOUS at 07:10

## 2023-10-11 RX ADMIN — DEXMEDETOMIDINE 5 MCG: 200 INJECTION, SOLUTION INTRAVENOUS at 07:10

## 2023-10-11 RX ADMIN — NEOSTIGMINE METHYLSULFATE 5 MG: 1 INJECTION INTRAVENOUS at 08:10

## 2023-10-11 RX ADMIN — OXYCODONE HYDROCHLORIDE AND ACETAMINOPHEN 2 TABLET: 5; 325 TABLET ORAL at 09:10

## 2023-10-11 RX ADMIN — KETOROLAC TROMETHAMINE 30 MG: 30 INJECTION, SOLUTION INTRAMUSCULAR at 07:10

## 2023-10-11 RX ADMIN — LIDOCAINE HYDROCHLORIDE 75 MG: 20 INJECTION INTRAVENOUS at 08:10

## 2023-10-11 RX ADMIN — HYDROMORPHONE HYDROCHLORIDE 0.5 MG: 0.5 INJECTION, SOLUTION INTRAMUSCULAR; INTRAVENOUS; SUBCUTANEOUS at 08:10

## 2023-10-11 RX ADMIN — GLYCOPYRROLATE 0.8 MG: 0.2 INJECTION INTRAMUSCULAR; INTRAVENOUS at 08:10

## 2023-10-11 RX ADMIN — CEFAZOLIN 2 G: 330 INJECTION, POWDER, FOR SOLUTION INTRAMUSCULAR; INTRAVENOUS at 07:10

## 2023-10-11 RX ADMIN — FENTANYL CITRATE 50 MCG: 50 INJECTION INTRAMUSCULAR; INTRAVENOUS at 07:10

## 2023-10-11 RX ADMIN — DEXAMETHASONE SODIUM PHOSPHATE 8 MG: 4 INJECTION, SOLUTION INTRA-ARTICULAR; INTRALESIONAL; INTRAMUSCULAR; INTRAVENOUS; SOFT TISSUE at 07:10

## 2023-10-11 RX ADMIN — ONDANSETRON 4 MG: 2 INJECTION INTRAMUSCULAR; INTRAVENOUS at 07:10

## 2023-10-11 RX ADMIN — SODIUM CHLORIDE, POTASSIUM CHLORIDE, SODIUM LACTATE AND CALCIUM CHLORIDE: 600; 310; 30; 20 INJECTION, SOLUTION INTRAVENOUS at 06:10

## 2023-10-11 RX ADMIN — MIDAZOLAM HYDROCHLORIDE 2 MG: 1 INJECTION, SOLUTION INTRAMUSCULAR; INTRAVENOUS at 06:10

## 2023-10-11 NOTE — OP NOTE
[facility] - Periop Services  Surgery Department  Operative Note    SUMMARY     Date of Procedure: 10/11/2023     Procedure:   Exam under anesthesia  Right lateral sphincterotomy  Anal fissure repair    Surgeon(s) and Role:     * Maxi Alford MD - Primary     * YonathanDawit milton MD - Resident - Assisting    Pre-Operative Diagnosis:   Anal pain    Post-Operative Diagnosis:   Anal fissure    Anesthesia: General    Operative Findings (including complications, if any):   Hypertonic internal anal sphincter  Posterior anal fissure  Right lateral sphincterotomy performed  Anal fissure repair performed    Description of Technical Procedures:   After informed consent was obtained, patient was transported into the operating room and general endotracheal anesthesia administered. He was then transferred onto the operating table and place in a prone-jackknife position. Patient's anus and surrounding skin were prepped with betadine and draped in standard sterile fashion. External anal examination was performed with no obvious pathology. Digital rectal exam revealed extremely high tone internal anal sphincter. Rectal retractor was lubed with betadine and inserted and entirety of anal canal examined. At the base of the previous fistulotomy site there was an area of anal fissure with internal anal sphincter muscle fibers visible. Decision was made to perform sphincterotomy. Incision with #15 blade was made in the intersphincteric groove in the right lateral position. Hemostat was inserted and internal anal sphincter isolated and sphincterotomy with #15 blade was performed. Mucosa was then closed with 3-0 chromic in a running fashion. Posterior anal fissure was closed in a similar fashion longitudinally. Repeat digital exam was performed with significant improvement in hypertonicity of internal anal sphincter. Anus was then irrigated. Bilateral pudendal nerve block with 10cc 1% lidocaine and local anesthetization of anus  was performed. Sterile dressings were applied, patient transferred back to the stretcher in supine position and awakened from anesthesia. He was then transported to PACU in stable condition.    Estimated Blood Loss (EBL): 20 mL           Implants: * No implants in log *    Specimens:   Specimen (24h ago, onward)      None                    Condition: Stable    Disposition: PACU - hemodynamically stable.    Dawit Mcmanus MD  [facility] General Surgery

## 2023-10-11 NOTE — INTERVAL H&P NOTE
The patient has been examined and the H&P has been reviewed:    I concur with the findings and no changes have occurred since H&P was written.    Surgery risks, benefits and alternative options discussed and understood by patient/family.    -To OR for EUA, possible fistulotomy, possible seton placement, possible I&D, all other indicated procedures    Dawit Mcmanus MD  [facility] General Surgery

## 2023-10-11 NOTE — DISCHARGE INSTRUCTIONS
· Keep follow up appointment on November 7th  at 12:00 pm [facility].      ` Resume home medications unless otherwise instructed by your doctor.    `  Be sure to use prescribed senna and glycolax to keep bowel movements very soft. Hydration and high fiber diet are important     · No heavy lifting or straining over 10 pounds for 2 weeks.    ` After bowel movements-use maría elena-bottle with warm soapy water then rinse with warm clean water , then pat dry.    ` You may apply prescribed Diltiazem cream or over-the-counter recticream is ok to use on incision area for numbing affect and to keep moist and then use guaze as needed    ` You may sitz bath 2-3 times daily starting tomorrow using plain warm water or regular epsom salt and warm water -SEE Handout in packet    · You may take a shower starting tomorrow evening. Wash GENTLY with soap and water (do not scrub) over incision, rinse with water, and pat dry.    · Do not soak your wound in water for 1 month. Do not take baths, swim, or use a hot tub until your doctor says it is okay.    · Use medications as instructed. You may alternate Ibuprofen and Tylenol (OR Norco) every 3-4 hours. Do not take Tylenol (acetaminophen) with your NORCO, since NORCO contains Tylenol as well.    · You may use an ice pack as needed for 20 minutes at a time over your incision site to minimize swelling and help relieve pain.    · Do not drink alcohol or drive today, or as long as you are on pain medication.    · Notify MD of any moderate to severe pain unrelieved by pain medicine, if your incision opens and/or bleeds, or for any signs of infection including fever above 100.4, excessive redness or swelling, yellow/green foul- smelling drainage, nausea or vomiting. Clinics number is [phone]. If it is after business hours or emergency call [phone] and state Im having post op complications and need to speak to the surgeon on call.    · Thanks for choosing [facility]! Have a smooth  recovery!

## 2023-10-11 NOTE — TRANSFER OF CARE
Anesthesia Transfer of Care Note    Patient: Mir Conroy    Procedure(s) Performed: Procedure(s) (LRB):  SPHINCTEROTOMY, ANAL, INTERNAL, LATERAL (Right)    Patient location: PACU    Anesthesia Type: general    Transport from OR: Transported from OR on room air with adequate spontaneous ventilation    Post pain: adequate analgesia    Post assessment: no apparent anesthetic complications and tolerated procedure well    Post vital signs: stable    Level of consciousness: sedated    Nausea/Vomiting: no nausea/vomiting    Complications: none    Transfer of care protocol was followed      Last vitals:   Visit Vitals  BP (!) 169/115   Pulse 72   Temp 36.3 °C (97.3 °F) (Temporal)   Resp 20   Wt 114.6 kg (252 lb 9.6 oz)   SpO2 100%   BMI 39.56 kg/m²

## 2023-10-11 NOTE — ANESTHESIA POSTPROCEDURE EVALUATION
Anesthesia Post Evaluation    Patient: Mir Conroy    Procedure(s) Performed: Procedure(s) (LRB):  SPHINCTEROTOMY, ANAL, INTERNAL, LATERAL (Right)    Final Anesthesia Type: general      Patient location during evaluation: [facility]  Post-procedure vital signs: reviewed and stable  Airway patency: patent      Anesthetic complications: no      Cardiovascular status: hemodynamically stable  Respiratory status: spontaneous ventilation  Follow-up not needed.          Vitals Value Taken Time   /94 10/11/23 0946   Temp 36.4 °C (97.5 °F) 10/11/23 0910   Pulse 64 10/11/23 1000   Resp 19 10/11/23 0922   SpO2 97 % 10/11/23 1000   Vitals shown include unvalidated device data.      Event Time   Out of Recovery 09:08:00         Pain/Kinga Score: Pain Rating Prior to Med Admin: 10 (10/11/2023  9:22 AM)  Kinga Score: 10 (10/11/2023  9:10 AM)

## 2023-10-11 NOTE — BRIEF OP NOTE
[facility] - Periop Services  Brief Operative Note    Surgery Date: 10/11/2023     Surgeon(s) and Role:     * Maxi Alford MD - Primary     * Dawit Mcmanus MD - Resident - Assisting    Pre-op Diagnosis:    Rectal pain    Post-op Diagnosis:    Anal fissure    Procedure(s) (LRB):  SPHINCTEROTOMY, ANAL, INTERNAL, LATERAL (Right)    Anesthesia: General    Operative Findings:  Right lateral sphincterotomy performed  Posterior anal fissure     Estimated Blood Loss: 20 mL         Specimens:   Specimen (24h ago, onward)      None            Discharge Note    OUTCOME: Patient tolerated treatment/procedure well without complication and is now ready for discharge.    DISPOSITION: Home or Self Care    FINAL DIAGNOSIS: Anal fissure    FOLLOWUP: In clinic    DISCHARGE INSTRUCTIONS:    -Follow-up in clinic in 2 weeks  -Okay to remove dressings tomorrow  -Strict bowel regimen to prevent constipation    Dawit Mcmanus MD  [facility]

## 2023-10-13 VITALS
DIASTOLIC BLOOD PRESSURE: 93 MMHG | RESPIRATION RATE: 20 BRPM | SYSTOLIC BLOOD PRESSURE: 156 MMHG | BODY MASS INDEX: 39.56 KG/M2 | HEART RATE: 70 BPM | OXYGEN SATURATION: 98 % | TEMPERATURE: 98 F | WEIGHT: 252.63 LBS

## 2023-11-07 ENCOUNTER — OFFICE VISIT (OUTPATIENT)
Dept: SURGERY | Facility: CLINIC | Age: 48
End: 2023-11-07
Payer: MEDICAID

## 2023-11-07 VITALS
SYSTOLIC BLOOD PRESSURE: 123 MMHG | HEIGHT: 67 IN | BODY MASS INDEX: 38.96 KG/M2 | WEIGHT: 248.19 LBS | HEART RATE: 70 BPM | DIASTOLIC BLOOD PRESSURE: 76 MMHG | RESPIRATION RATE: 18 BRPM | TEMPERATURE: 98 F | OXYGEN SATURATION: 98 %

## 2023-11-07 DIAGNOSIS — K60.2 ANAL FISSURE: Primary | ICD-10-CM

## 2023-11-07 PROCEDURE — 99215 OFFICE O/P EST HI 40 MIN: CPT | Mod: PBBFAC | Performed by: SURGERY

## 2023-11-07 RX ORDER — IBUPROFEN 800 MG/1
800 TABLET ORAL 3 TIMES DAILY
Qty: 90 TABLET | Refills: 0 | Status: SHIPPED | OUTPATIENT
Start: 2023-11-07 | End: 2023-12-07

## 2023-11-07 NOTE — PROGRESS NOTES
Pt seen by Dr. Watts; Urology referral placed; Pt instructed to return to clinic in 4 weeks; Discharge paperwork given w/pt verbalizing understanding

## 2023-11-07 NOTE — PROGRESS NOTES
"  [facility] Clinic Note    HPI: 49yo M w/ hx of intersphincteric anal fistula s/p EUA with fistulotomy on 5/19/23 and repeat EUA with sphincterotomy on 10/11/2023. Pain is improved since surgery but still present. Worse with bowel movements and is "excruciating" for about an hour afterwards. Denies any blood in stool, diarrhea, constipation, or incontinence. Endorses dysuria since fistulotomy in May, was referred to urology but no showed because he felt like symptoms were improving, however they have return and he is again requesting a referral.     PMH: History reviewed. No pertinent past medical history.   Meds:   Current Outpatient Medications:     diltiazem HCl (DILTIAZEM 2% - LIDOCAINE 5% CREAM), Apply 1 Application topically 2 (two) times a day., Disp: 100 g, Rfl: 2    gabapentin (NEURONTIN) 300 MG capsule, Take 1 capsule (300 mg total) by mouth 3 (three) times daily., Disp: 90 capsule, Rfl: 11    polyethylene glycol (GLYCOLAX) 17 gram PwPk, Take 46 g by mouth 3 (three) times daily., Disp: 2922.5 each, Rfl: 0    psyllium husk, aspartame, (METAMUCIL) 3.4 gram PwPk packet, Take 1 packet by mouth 2 (two) times daily., Disp: 60 packet, Rfl: 11    senna-docusate 8.6-50 mg (SENNA WITH DOCUSATE SODIUM) 8.6-50 mg per tablet, Take 2 tablets by mouth 2 (two) times a day., Disp: 60 tablet, Rfl: 11    HYDROcodone-acetaminophen (NORCO) 7.5-325 mg per tablet, Take 1 tablet by mouth every 6 (six) hours as needed for Pain. (Patient not taking: Reported on 11/7/2023), Disp: 20 tablet, Rfl: 0    Current Facility-Administered Medications:     heparin (porcine) injection 5,000 Units, 5,000 Units, Subcutaneous, Once, Keely Dominguez MD    Facility-Administered Medications Ordered in Other Visits:     albuterol-ipratropium 2.5 mg-0.5 mg/3 mL nebulizer solution 3 mL, 3 mL, Nebulization, Once PRN, Mecca Mckinley MD    diphenhydrAMINE injection 25 mg, 25 mg, Intravenous, Once PRN, Mecca Mckinley MD    " HYDROmorphone injection 0.2 mg, 0.2 mg, Intravenous, Q5 Min PRN, Mecca Mckinley MD    HYDROmorphone injection 0.5 mg, 0.5 mg, Intravenous, Q5 Min PRN, Mecca Mckinley MD, 0.5 mg at 10/11/23 0905    lactated ringers infusion, , Intravenous, Continuous, Melvi Huber MD, Stopped at 23 1122    lactated ringers infusion, , Intravenous, Continuous, Mecca Mckinley MD, Last Rate: 10 mL/hr at 10/11/23 06, New Bag at 10/11/23 06    ondansetron injection 4 mg, 4 mg, Intravenous, Once, Mecca Mckinley MD    prochlorperazine injection Soln 5 mg, 5 mg, Intravenous, Once PRN, Mecca Mckinley MD  Allergies: Review of patient's allergies indicates:  No Known Allergies  Social History:   Social History     Tobacco Use    Smoking status: Some Days     Current packs/day: 0.00     Average packs/day: 0.5 packs/day for 3.0 years (1.5 ttl pk-yrs)     Types: Cigarettes     Start date:      Last attempt to quit:      Years since quittin.8    Smokeless tobacco: Never    Tobacco comments:     A pack a week   Substance Use Topics    Alcohol use: Not Currently    Drug use: Not Currently     Frequency: 1.0 times per week     Types: Marijuana     Comment: weekly     Family History: History reviewed. No pertinent family history.  Surgical History:   Past Surgical History:   Procedure Laterality Date    DIGITAL RECTAL EXAMINATION UNDER ANESTHESIA N/A 2023    Procedure: EXAM UNDER ANESTHESIA, DIGITAL, RECTUM;  Surgeon: Maxi Alford MD;  Location: [facility];  Service: General;  Laterality: N/A;  With possible fistulotomy, possible seton, possible sphincterotomy, possible hemorrhoidectomy    EXAMINATION UNDER ANESTHESIA N/A 2023    Procedure: Exam under anesthesia;  Surgeon: Davin Lombardi MD;  Location: [facility];  Service: General;  Laterality: N/A;    hemarrhoids      INCISION AND DRAINAGE OF BUTTOCK N/A 2023    Procedure: INCISION AND DRAINAGE, BUTTOCK;  Surgeon: Davin RIVERA  MD Harjit;  Location: [facility] OR;  Service: General;  Laterality: N/A;  gluteal abscess    LATERAL INTERNAL ANAL SPHINCTEROTOMY Right 10/11/2023    Procedure: SPHINCTEROTOMY, ANAL, INTERNAL, LATERAL;  Surgeon: Maxi Alford MD;  Location: [facility] OR;  Service: General;  Laterality: Right;    RECTAL FISTULOTOMY N/A 05/19/2023    Procedure: FISTULOTOMY, RECTUM;  Surgeon: Maxi Alford MD;  Location: [facility] OR;  Service: General;  Laterality: N/A;       Objective:    Vitals:  Vitals:    11/07/23 1217   BP: 123/76   Pulse: 70   Resp: 18   Temp: 98 °F (36.7 °C)            Physical Exam:  Gen: NAD  Neuro: awake, alert, answering questions appropriately  CV: RRR  Resp: non-labored breathing, COLBY  Abd: soft, ND, NT  : On STEPHANIE anal sphincter is intact without any palpable deformities. No gross blood noted. Pt expressed pain on exam but tolerated.  Ext: moves all 4 spontaneously and purposefully  Skin: warm, well perfused        Assessment/Plan:  47yo M w/ hx of intersphincteric anal fistula s/p EUA with fistulotomy on 5/19/23 and repeat EUA with sphincterotomy on 10/11/2023. Pain with defecation is till present but improving per patient.     - Discussed alternating tylenol and ibuprofen for better pain control, educated on side effects of continued narcotic use in relation to bowel function.  - Recommended increasing fiber and water intake  - RTC in 4 weeks for follow up     George Watts MD  [facility], HO1  11/07/2023 12:51 PM

## 2023-11-08 ENCOUNTER — OFFICE VISIT (OUTPATIENT)
Dept: UROLOGY | Facility: CLINIC | Age: 48
End: 2023-11-08
Payer: MEDICAID

## 2023-11-08 VITALS
WEIGHT: 247.19 LBS | HEIGHT: 67 IN | HEART RATE: 61 BPM | BODY MASS INDEX: 38.8 KG/M2 | RESPIRATION RATE: 20 BRPM | TEMPERATURE: 98 F | DIASTOLIC BLOOD PRESSURE: 77 MMHG | SYSTOLIC BLOOD PRESSURE: 111 MMHG | OXYGEN SATURATION: 97 %

## 2023-11-08 DIAGNOSIS — N52.8 OTHER MALE ERECTILE DYSFUNCTION: ICD-10-CM

## 2023-11-08 DIAGNOSIS — N52.9 ERECTILE DYSFUNCTION, UNSPECIFIED ERECTILE DYSFUNCTION TYPE: ICD-10-CM

## 2023-11-08 DIAGNOSIS — R39.11 URINARY HESITANCY: Primary | ICD-10-CM

## 2023-11-08 DIAGNOSIS — R53.83 FATIGUE, UNSPECIFIED TYPE: ICD-10-CM

## 2023-11-08 DIAGNOSIS — N50.819 PAIN IN TESTICLE, UNSPECIFIED LATERALITY: ICD-10-CM

## 2023-11-08 DIAGNOSIS — K60.2 ANAL FISSURE: ICD-10-CM

## 2023-11-08 LAB
BILIRUB SERPL-MCNC: NEGATIVE MG/DL
BLOOD URINE, POC: NEGATIVE
COLOR, POC UA: NORMAL
GLUCOSE UR QL STRIP: NEGATIVE
KETONES UR QL STRIP: NORMAL
LEUKOCYTE ESTERASE URINE, POC: NEGATIVE
NITRITE, POC UA: NEGATIVE
PH, POC UA: 6
POC RESIDUAL URINE VOLUME: 0 ML (ref 0–100)
PROTEIN, POC: NEGATIVE
SPECIFIC GRAVITY, POC UA: 1.03
UROBILINOGEN, POC UA: 0.2

## 2023-11-08 PROCEDURE — 99215 OFFICE O/P EST HI 40 MIN: CPT | Mod: PBBFAC | Performed by: NURSE PRACTITIONER

## 2023-11-08 PROCEDURE — 1159F MED LIST DOCD IN RCRD: CPT | Mod: CPTII,,, | Performed by: NURSE PRACTITIONER

## 2023-11-08 PROCEDURE — 3074F SYST BP LT 130 MM HG: CPT | Mod: CPTII,,, | Performed by: NURSE PRACTITIONER

## 2023-11-08 PROCEDURE — 99204 OFFICE O/P NEW MOD 45 MIN: CPT | Mod: S$PBB,,, | Performed by: NURSE PRACTITIONER

## 2023-11-08 PROCEDURE — 81001 URINALYSIS AUTO W/SCOPE: CPT | Mod: PBBFAC | Performed by: NURSE PRACTITIONER

## 2023-11-08 PROCEDURE — 99204 PR OFFICE/OUTPT VISIT, NEW, LEVL IV, 45-59 MIN: ICD-10-PCS | Mod: S$PBB,,, | Performed by: NURSE PRACTITIONER

## 2023-11-08 PROCEDURE — 3008F BODY MASS INDEX DOCD: CPT | Mod: CPTII,,, | Performed by: NURSE PRACTITIONER

## 2023-11-08 PROCEDURE — 51798 US URINE CAPACITY MEASURE: CPT | Mod: PBBFAC | Performed by: NURSE PRACTITIONER

## 2023-11-08 PROCEDURE — 3008F PR BODY MASS INDEX (BMI) DOCUMENTED: ICD-10-PCS | Mod: CPTII,,, | Performed by: NURSE PRACTITIONER

## 2023-11-08 PROCEDURE — 3078F PR MOST RECENT DIASTOLIC BLOOD PRESSURE < 80 MM HG: ICD-10-PCS | Mod: CPTII,,, | Performed by: NURSE PRACTITIONER

## 2023-11-08 PROCEDURE — 3074F PR MOST RECENT SYSTOLIC BLOOD PRESSURE < 130 MM HG: ICD-10-PCS | Mod: CPTII,,, | Performed by: NURSE PRACTITIONER

## 2023-11-08 PROCEDURE — 1159F PR MEDICATION LIST DOCUMENTED IN MEDICAL RECORD: ICD-10-PCS | Mod: CPTII,,, | Performed by: NURSE PRACTITIONER

## 2023-11-08 PROCEDURE — 3078F DIAST BP <80 MM HG: CPT | Mod: CPTII,,, | Performed by: NURSE PRACTITIONER

## 2023-11-08 RX ORDER — TAMSULOSIN HYDROCHLORIDE 0.4 MG/1
0.4 CAPSULE ORAL DAILY
Qty: 30 CAPSULE | Refills: 11 | Status: SHIPPED | OUTPATIENT
Start: 2023-11-08 | End: 2024-11-07

## 2023-11-08 NOTE — PROGRESS NOTES
Chief Complaint: No chief complaint on file.      HPI:  Patient is a 48-year-old male referred to Urology for urinary hesitancy.  Patient underwent recent rectal surgery for rectal fissure 4 weeks ago.  Therefore patient defers rectal exam at this time.  Patient complaining of urinary hesitancy and frequency having to urinate approximately 7 times during the day and having to strain for all urination.  Patient also complaining of testicular pain with ejaculation.  Patient complaining decrease erections able to attain however difficulty maintaining erections.  Patient denies any dysuria, urinary retention, urinary incontinence, gross hematuria.  Bladder scan 0 mL.    Allergies:  Review of patient's allergies indicates:  No Known Allergies    Medications:  Current Outpatient Medications   Medication Sig Dispense Refill    ibuprofen (ADVIL,MOTRIN) 800 MG tablet Take 1 tablet (800 mg total) by mouth 3 (three) times daily. 90 tablet 0    polyethylene glycol (GLYCOLAX) 17 gram PwPk Take 46 g by mouth 3 (three) times daily. 2922.5 each 0    psyllium husk, aspartame, (METAMUCIL) 3.4 gram PwPk packet Take 1 packet by mouth 2 (two) times daily. 60 packet 11    senna-docusate 8.6-50 mg (SENNA WITH DOCUSATE SODIUM) 8.6-50 mg per tablet Take 2 tablets by mouth 2 (two) times a day. 60 tablet 11    diltiazem HCl (DILTIAZEM 2% - LIDOCAINE 5% CREAM) Apply 1 Application topically 2 (two) times a day. (Patient not taking: Reported on 11/8/2023) 100 g 2    gabapentin (NEURONTIN) 300 MG capsule Take 1 capsule (300 mg total) by mouth 3 (three) times daily. (Patient not taking: Reported on 11/8/2023) 90 capsule 11    HYDROcodone-acetaminophen (NORCO) 7.5-325 mg per tablet Take 1 tablet by mouth every 6 (six) hours as needed for Pain. (Patient not taking: Reported on 11/7/2023) 20 tablet 0     Current Facility-Administered Medications   Medication Dose Route Frequency Provider Last Rate Last Admin    heparin (porcine) injection 5,000 Units   5,000 Units Subcutaneous Once Keely Dominguez MD         Facility-Administered Medications Ordered in Other Visits   Medication Dose Route Frequency Provider Last Rate Last Admin    albuterol-ipratropium 2.5 mg-0.5 mg/3 mL nebulizer solution 3 mL  3 mL Nebulization Once PRN Mecca Mckinley MD        diphenhydrAMINE injection 25 mg  25 mg Intravenous Once PRN Mecca Mckinley MD        HYDROmorphone injection 0.2 mg  0.2 mg Intravenous Q5 Min PRN Mecca Mckinley MD        HYDROmorphone injection 0.5 mg  0.5 mg Intravenous Q5 Min PRN Mecca Mckinley MD   0.5 mg at 10/11/23 0905    lactated ringers infusion   Intravenous Continuous Melvi Huber MD   Stopped at 04/21/23 1122    lactated ringers infusion   Intravenous Continuous Mecca Mckinley MD 10 mL/hr at 10/11/23 0633 New Bag at 10/11/23 0633    ondansetron injection 4 mg  4 mg Intravenous Once Mecca Mckinley MD        prochlorperazine injection Soln 5 mg  5 mg Intravenous Once PRN Mecca Mckinley MD           Review of Systems:  General: No fever, chills, fatigability, or weight loss.  Skin: No rashes, itching, or changes in color or texture of skin.  Chest: Denies LYNN, cyanosis, wheezing, cough, and sputum production.  Abdomen: Appetite fine. No weight loss. Denies diarrhea, abdominal pain, hematemesis, or blood in stool.  Musculoskeletal: No joint stiffness or swelling. Denies back pain.  : As above.  All other review of systems negative.    PMH:  No past medical history on file.    PSH:  Past Surgical History:   Procedure Laterality Date    DIGITAL RECTAL EXAMINATION UNDER ANESTHESIA N/A 05/19/2023    Procedure: EXAM UNDER ANESTHESIA, DIGITAL, RECTUM;  Surgeon: Maxi Alford MD;  Location: [facility];  Service: General;  Laterality: N/A;  With possible fistulotomy, possible seton, possible sphincterotomy, possible hemorrhoidectomy    EXAMINATION UNDER ANESTHESIA N/A 04/21/2023    Procedure: Exam under anesthesia;   Surgeon: Davin Lombardi MD;  Location: [facility] OR;  Service: General;  Laterality: N/A;    hemarrhoids      INCISION AND DRAINAGE OF BUTTOCK N/A 2023    Procedure: INCISION AND DRAINAGE, BUTTOCK;  Surgeon: Davin Lombardi MD;  Location: [facility] OR;  Service: General;  Laterality: N/A;  gluteal abscess    LATERAL INTERNAL ANAL SPHINCTEROTOMY Right 10/11/2023    Procedure: SPHINCTEROTOMY, ANAL, INTERNAL, LATERAL;  Surgeon: Maxi Alford MD;  Location: [facility] OR;  Service: General;  Laterality: Right;    RECTAL FISTULOTOMY N/A 2023    Procedure: FISTULOTOMY, RECTUM;  Surgeon: Maxi Alford MD;  Location: [facility] OR;  Service: General;  Laterality: N/A;       FamHx:  No family history on file.    SocHx:  Social History     Socioeconomic History    Marital status:    Tobacco Use    Smoking status: Some Days     Current packs/day: 0.00     Average packs/day: 0.5 packs/day for 3.0 years (1.5 ttl pk-yrs)     Types: Cigarettes     Start date:      Last attempt to quit:      Years since quittin.8     Passive exposure: Current    Smokeless tobacco: Never    Tobacco comments:     A pack a week   Substance and Sexual Activity    Alcohol use: Not Currently    Drug use: Not Currently     Frequency: 1.0 times per week     Types: Marijuana     Comment: weekly    Sexual activity: Not Currently       Physical Exam:  Vitals:    23 1341   BP: 111/77   Pulse: 61   Resp: 20   Temp: 98.1 °F (36.7 °C)     General: A&Ox3, no apparent distress, no deformities  Neck: No masses, normal thyroid  Lungs: CTA tommy, no use of accessory muscles  Heart: RRR, no arrhythmias  Abdomen: Soft, NT, ND, no masses, no hernias, no hepatosplenomegaly  Lymphatic: Neck and groin nodes negative  Skin: The skin is warm and dry. No jaundice.  Ext: No c/c/e.    GUMale:  Patient deferred at this time due to rectal pain with recent surgery due to anal fissures.    Urinalysis:  Results for orders placed or performed in visit on  11/08/23   POCT URINE DIPSTICK WITH MICROSCOPE, AUTOMATED   Result Value Ref Range    Color, UA Dark Yellow     Spec Grav UA 1.030     pH, UA 6.0     WBC, UA Negative     Nitrite, UA Negative     Protein, POC Negative     Glucose, UA Negative     Ketones, UA Trace     Urobilinogen, UA 0.2     Bilirubin, POC Negative     Blood, UA Negative    Microscopic urinalysis negative RBCs, WBCs, nitrites.      Impression:  1. Anal fissure  - Ambulatory referral/consult to Urology  - POCT URINE DIPSTICK WITH MICROSCOPE, AUTOMATED    2. Urinary hesitancy  - POCT Bladder Scan  3. Erectile dysfunction  4. Testicular pain    Plan:  Instructed patient tamsulosin 0.4 mg p.o. daily.  Scrotal ultrasound notify patient of results when completed.  RTC one-month for re-evaluation of urinary hesitancy.  Labs to evaluate hypogonadism and erectile dysfunction.  on timed voiding every 2-3 hrs, double voiding, avoidance of bladder irritants such as alcohol, citrus foods, chocolate, caffeinated drinks, energy drinks, spicy foods, sugar, caffeine products, sodas. Instructed patient to avoid drinking fluids 1-2 hours prior to bedtime & void immediately before bedtime.

## 2023-11-08 NOTE — PROGRESS NOTES
Placed in room. Seen by William Gleler NP. Spoke with patient. Do labs before RTC in 1 month. Get scrotal U/S also.

## 2023-11-27 ENCOUNTER — HOSPITAL ENCOUNTER (OUTPATIENT)
Dept: RADIOLOGY | Facility: HOSPITAL | Age: 48
Discharge: HOME | End: 2023-11-27
Attending: NURSE PRACTITIONER
Payer: MEDICAID

## 2023-11-27 DIAGNOSIS — N50.819 PAIN IN TESTICLE, UNSPECIFIED LATERALITY: ICD-10-CM

## 2023-11-27 PROCEDURE — 76870 US EXAM SCROTUM: CPT | Mod: TC

## 2023-12-05 ENCOUNTER — OFFICE VISIT (OUTPATIENT)
Dept: SURGERY | Facility: CLINIC | Age: 48
End: 2023-12-05
Payer: MEDICAID

## 2023-12-05 VITALS
SYSTOLIC BLOOD PRESSURE: 103 MMHG | RESPIRATION RATE: 18 BRPM | WEIGHT: 254 LBS | HEIGHT: 67 IN | HEART RATE: 76 BPM | BODY MASS INDEX: 39.87 KG/M2 | DIASTOLIC BLOOD PRESSURE: 71 MMHG | OXYGEN SATURATION: 98 %

## 2023-12-05 DIAGNOSIS — Z87.19 HISTORY OF ANAL FISSURES: Primary | ICD-10-CM

## 2023-12-05 PROCEDURE — 99214 OFFICE O/P EST MOD 30 MIN: CPT | Mod: PBBFAC

## 2023-12-05 NOTE — PROGRESS NOTES
[facility] General Surgery Clinic Note    HPI: Mir Conroy is a 48 y.o. male with a history of intersphincteric anal fistula s/p EUA with fistulotomy (5/19/23) and repeat EUA with sphincterotomy (10/11/23) who presents for follow up of persistent pain with defecation. At his 2 week post operative appointment on 11/7, patient reported continued complaints of pain with defecation at which time he began fiber supplementation and stool softener. Since then, patient reports complete relief of symptoms and his having daily bowel movements without straining or pain. He denies any nausea, vomiting, hematochezia, or melena.     PMH: History reviewed. No pertinent past medical history.     Meds:   Current Outpatient Medications:     ibuprofen (ADVIL,MOTRIN) 800 MG tablet, Take 1 tablet (800 mg total) by mouth 3 (three) times daily., Disp: 90 tablet, Rfl: 0    polyethylene glycol (GLYCOLAX) 17 gram PwPk, Take 46 g by mouth 3 (three) times daily., Disp: 2922.5 each, Rfl: 0    psyllium husk, aspartame, (METAMUCIL) 3.4 gram PwPk packet, Take 1 packet by mouth 2 (two) times daily., Disp: 60 packet, Rfl: 11    senna-docusate 8.6-50 mg (SENNA WITH DOCUSATE SODIUM) 8.6-50 mg per tablet, Take 2 tablets by mouth 2 (two) times a day., Disp: 60 tablet, Rfl: 11    tamsulosin (FLOMAX) 0.4 mg Cap, Take 1 capsule (0.4 mg total) by mouth once daily., Disp: 30 capsule, Rfl: 11    diltiazem HCl (DILTIAZEM 2% - LIDOCAINE 5% CREAM), Apply 1 Application topically 2 (two) times a day. (Patient not taking: Reported on 11/8/2023), Disp: 100 g, Rfl: 2    gabapentin (NEURONTIN) 300 MG capsule, Take 1 capsule (300 mg total) by mouth 3 (three) times daily. (Patient not taking: Reported on 11/8/2023), Disp: 90 capsule, Rfl: 11    HYDROcodone-acetaminophen (NORCO) 7.5-325 mg per tablet, Take 1 tablet by mouth every 6 (six) hours as needed for Pain. (Patient not taking: Reported on 11/7/2023), Disp: 20 tablet, Rfl: 0    Current Facility-Administered  Medications:     heparin (porcine) injection 5,000 Units, 5,000 Units, Subcutaneous, Once, Keely Dominguez MD    Facility-Administered Medications Ordered in Other Visits:     albuterol-ipratropium 2.5 mg-0.5 mg/3 mL nebulizer solution 3 mL, 3 mL, Nebulization, Once PRN, Mecca Mckinley MD    diphenhydrAMINE injection 25 mg, 25 mg, Intravenous, Once PRN, Mecca Mckinley MD    HYDROmorphone injection 0.2 mg, 0.2 mg, Intravenous, Q5 Min PRN, Mecca Mckinley MD    HYDROmorphone injection 0.5 mg, 0.5 mg, Intravenous, Q5 Min PRN, Mecca Mckinley MD, 0.5 mg at 10/11/23 0905    lactated ringers infusion, , Intravenous, Continuous, Melvi Huber MD, Stopped at 23 1122    lactated ringers infusion, , Intravenous, Continuous, Mecca Mckinley MD, Last Rate: 10 mL/hr at 10/11/23 0633, New Bag at 10/11/23 0633    ondansetron injection 4 mg, 4 mg, Intravenous, Once, Mecca Mckinley MD    prochlorperazine injection Soln 5 mg, 5 mg, Intravenous, Once PRN, Mecca Mckinley MD    Allergies: Review of patient's allergies indicates:  No Known Allergies    Social History:   Social History     Tobacco Use    Smoking status: Some Days     Current packs/day: 0.00     Average packs/day: 0.5 packs/day for 3.0 years (1.5 ttl pk-yrs)     Types: Cigarettes     Start date:      Last attempt to quit:      Years since quittin.9     Passive exposure: Current    Smokeless tobacco: Never    Tobacco comments:     A pack a week   Substance Use Topics    Alcohol use: Not Currently    Drug use: Not Currently     Frequency: 1.0 times per week     Types: Marijuana     Comment: weekly     Family History: No family history on file.    Surgical History:   Past Surgical History:   Procedure Laterality Date    DIGITAL RECTAL EXAMINATION UNDER ANESTHESIA N/A 2023    Procedure: EXAM UNDER ANESTHESIA, DIGITAL, RECTUM;  Surgeon: Maxi Alford MD;  Location: [facility];  Service: General;  Laterality:  N/A;  With possible fistulotomy, possible seton, possible sphincterotomy, possible hemorrhoidectomy    EXAMINATION UNDER ANESTHESIA N/A 04/21/2023    Procedure: Exam under anesthesia;  Surgeon: Davin Lombardi MD;  Location: [facility] OR;  Service: General;  Laterality: N/A;    hemarrhoids      INCISION AND DRAINAGE OF BUTTOCK N/A 04/21/2023    Procedure: INCISION AND DRAINAGE, BUTTOCK;  Surgeon: Davin Lombardi MD;  Location: [facility] OR;  Service: General;  Laterality: N/A;  gluteal abscess    LATERAL INTERNAL ANAL SPHINCTEROTOMY Right 10/11/2023    Procedure: SPHINCTEROTOMY, ANAL, INTERNAL, LATERAL;  Surgeon: Maxi Alford MD;  Location: [facility] OR;  Service: General;  Laterality: Right;    RECTAL FISTULOTOMY N/A 05/19/2023    Procedure: FISTULOTOMY, RECTUM;  Surgeon: Maxi Alford MD;  Location: [facility] OR;  Service: General;  Laterality: N/A;     Review of Systems:  Negative unless otherwise stated in HPI.    Objective:    Vitals:  Vitals:    12/05/23 1307   Resp: 18      Physical Exam:  Gen: NAD  Neuro: awake, alert, answering questions appropriately  CV: RRR, +2 radial pulses.  Resp: non-labored breathing on room air  Abd: soft, ND, NT  : External exam without evidence of lesions. STEPHANIE with good tone, no blood, unable to assess for an  Ext: moves all 4 spontaneously and purposefully  Skin: warm, well perfused    Assessment/Plan:  Mir Conroy is a 48 y.o. male with a history of intersphincteric anal fistula s/p EUA with fistulotomy (5/19/23) and repeat EUA with sphincterotomy (10/11/23) who presents for follow up.     - Recommend continuing fiber supplementation. Educated on importance of daily use with increased water intake.  - RTC PRN    Gretta Dennison MS3  12/05/2023 1:10 PM     Patient seen and examined with medical student. Note written with assistance from medical student and edited as needed above.     Sukhdev Rincon MD  [employer] Surgery, PGY-3

## 2023-12-07 ENCOUNTER — OFFICE VISIT (OUTPATIENT)
Dept: UROLOGY | Facility: CLINIC | Age: 48
End: 2023-12-07
Payer: MEDICAID

## 2023-12-07 VITALS
OXYGEN SATURATION: 99 % | TEMPERATURE: 99 F | HEART RATE: 87 BPM | BODY MASS INDEX: 40.44 KG/M2 | HEIGHT: 67 IN | SYSTOLIC BLOOD PRESSURE: 113 MMHG | DIASTOLIC BLOOD PRESSURE: 73 MMHG | WEIGHT: 257.63 LBS

## 2023-12-07 DIAGNOSIS — R79.89 ELEVATED PTHRP LEVEL: ICD-10-CM

## 2023-12-07 DIAGNOSIS — N50.819 PAIN IN TESTICLE, UNSPECIFIED LATERALITY: ICD-10-CM

## 2023-12-07 DIAGNOSIS — R39.11 URINARY HESITANCY: Primary | ICD-10-CM

## 2023-12-07 LAB
BILIRUB SERPL-MCNC: NEGATIVE MG/DL
BLOOD URINE, POC: NEGATIVE
COLOR, POC UA: YELLOW
GLUCOSE UR QL STRIP: NEGATIVE
KETONES UR QL STRIP: NEGATIVE
LEUKOCYTE ESTERASE URINE, POC: NEGATIVE
NITRITE, POC UA: NEGATIVE
PH, POC UA: 5.5
PROTEIN, POC: NEGATIVE
SPECIFIC GRAVITY, POC UA: 1.03
UROBILINOGEN, POC UA: 0.2

## 2023-12-07 PROCEDURE — 99215 OFFICE O/P EST HI 40 MIN: CPT | Mod: PBBFAC | Performed by: NURSE PRACTITIONER

## 2023-12-07 PROCEDURE — 3074F PR MOST RECENT SYSTOLIC BLOOD PRESSURE < 130 MM HG: ICD-10-PCS | Mod: CPTII,,, | Performed by: NURSE PRACTITIONER

## 2023-12-07 PROCEDURE — 81001 URINALYSIS AUTO W/SCOPE: CPT | Mod: PBBFAC | Performed by: NURSE PRACTITIONER

## 2023-12-07 PROCEDURE — 3008F PR BODY MASS INDEX (BMI) DOCUMENTED: ICD-10-PCS | Mod: CPTII,,, | Performed by: NURSE PRACTITIONER

## 2023-12-07 PROCEDURE — 3008F BODY MASS INDEX DOCD: CPT | Mod: CPTII,,, | Performed by: NURSE PRACTITIONER

## 2023-12-07 PROCEDURE — 3074F SYST BP LT 130 MM HG: CPT | Mod: CPTII,,, | Performed by: NURSE PRACTITIONER

## 2023-12-07 PROCEDURE — 99213 PR OFFICE/OUTPT VISIT, EST, LEVL III, 20-29 MIN: ICD-10-PCS | Mod: S$PBB,,, | Performed by: NURSE PRACTITIONER

## 2023-12-07 PROCEDURE — 3078F PR MOST RECENT DIASTOLIC BLOOD PRESSURE < 80 MM HG: ICD-10-PCS | Mod: CPTII,,, | Performed by: NURSE PRACTITIONER

## 2023-12-07 PROCEDURE — 1159F PR MEDICATION LIST DOCUMENTED IN MEDICAL RECORD: ICD-10-PCS | Mod: CPTII,,, | Performed by: NURSE PRACTITIONER

## 2023-12-07 PROCEDURE — 1159F MED LIST DOCD IN RCRD: CPT | Mod: CPTII,,, | Performed by: NURSE PRACTITIONER

## 2023-12-07 PROCEDURE — 99213 OFFICE O/P EST LOW 20 MIN: CPT | Mod: S$PBB,,, | Performed by: NURSE PRACTITIONER

## 2023-12-07 PROCEDURE — 3078F DIAST BP <80 MM HG: CPT | Mod: CPTII,,, | Performed by: NURSE PRACTITIONER

## 2023-12-07 NOTE — PROGRESS NOTES
Placed in room. Seen by William Geller NP. Spoke with patient. RTC in 6 months with PSA and scrotal U/S.

## 2023-12-07 NOTE — PROGRESS NOTES
Chief Complaint:   Chief Complaint   Patient presents with    Follow-up     Pt states medication has helped urinary hesitancy.       HPI: Patient is a 48-year-old male patient here for one-month follow-up for urinary hesitancy.  Patient underwent recent rectal surgery for rectal fissure 4 weeks ago.  On patient's previous visit he defered rectal exam at that time.  Patient complained of urinary hesitancy and frequency having to urinate approximately 7 times during the day and having to strain for all urination.  Patient also complained of testicular pain with ejaculation, decrease erections able to attain however difficulty maintaining erections.  PSA 0.47, testosterone 483.  Mild elevation of PTH 85.1 instructed patient follow-up with PCP.  Patient was initiated on tamsulosin 0.4 mg p.o. daily and also instructed to have a scrotal ultrasound performed.  Today patient denies any urinary frequency, urinary urgency, urinary hesitancy, urinary dribbling, urinary retention, urinary incontinence, gross hematuria, scrotal discomfort.        Allergies:  Review of patient's allergies indicates:  No Known Allergies    Medications:  Current Outpatient Medications   Medication Sig Dispense Refill    ibuprofen (ADVIL,MOTRIN) 800 MG tablet Take 1 tablet (800 mg total) by mouth 3 (three) times daily. 90 tablet 0    polyethylene glycol (GLYCOLAX) 17 gram PwPk Take 46 g by mouth 3 (three) times daily. 2922.5 each 0    psyllium husk, aspartame, (METAMUCIL) 3.4 gram PwPk packet Take 1 packet by mouth 2 (two) times daily. 60 packet 11    senna-docusate 8.6-50 mg (SENNA WITH DOCUSATE SODIUM) 8.6-50 mg per tablet Take 2 tablets by mouth 2 (two) times a day. 60 tablet 11    tamsulosin (FLOMAX) 0.4 mg Cap Take 1 capsule (0.4 mg total) by mouth once daily. 30 capsule 11    diltiazem HCl (DILTIAZEM 2% - LIDOCAINE 5% CREAM) Apply 1 Application topically 2 (two) times a day. (Patient not taking: Reported on 11/8/2023) 100 g 2    gabapentin  (NEURONTIN) 300 MG capsule Take 1 capsule (300 mg total) by mouth 3 (three) times daily. (Patient not taking: Reported on 11/8/2023) 90 capsule 11    HYDROcodone-acetaminophen (NORCO) 7.5-325 mg per tablet Take 1 tablet by mouth every 6 (six) hours as needed for Pain. (Patient not taking: Reported on 11/7/2023) 20 tablet 0     Current Facility-Administered Medications   Medication Dose Route Frequency Provider Last Rate Last Admin    heparin (porcine) injection 5,000 Units  5,000 Units Subcutaneous Once Keely Dominguez MD         Facility-Administered Medications Ordered in Other Visits   Medication Dose Route Frequency Provider Last Rate Last Admin    albuterol-ipratropium 2.5 mg-0.5 mg/3 mL nebulizer solution 3 mL  3 mL Nebulization Once PRN Mecca Mckinley MD        diphenhydrAMINE injection 25 mg  25 mg Intravenous Once PRN Mecca Mckinley MD        HYDROmorphone injection 0.2 mg  0.2 mg Intravenous Q5 Min PRN Mecca Mckinley MD        HYDROmorphone injection 0.5 mg  0.5 mg Intravenous Q5 Min PRN Mecca Mckinley MD   0.5 mg at 10/11/23 0905    lactated ringers infusion   Intravenous Continuous Melvi Huber MD   Stopped at 04/21/23 1122    lactated ringers infusion   Intravenous Continuous Mecca Mckinley MD 10 mL/hr at 10/11/23 0633 New Bag at 10/11/23 0633    ondansetron injection 4 mg  4 mg Intravenous Once Mecca Mckinley MD        prochlorperazine injection Soln 5 mg  5 mg Intravenous Once PRN Mecca Mckinley MD           Review of Systems:  General: No fever, chills, fatigability, or weight loss.  Skin: No rashes, itching, or changes in color or texture of skin.  Chest: Denies LYNN, cyanosis, wheezing, cough, and sputum production.  Abdomen: Appetite fine. No weight loss. Denies diarrhea, abdominal pain, hematemesis, or blood in stool.  Musculoskeletal: No joint stiffness or swelling. Denies back pain.  : As above.  All other review of systems  negative.    PMH:  History reviewed. No pertinent past medical history.    PSH:  Past Surgical History:   Procedure Laterality Date    DIGITAL RECTAL EXAMINATION UNDER ANESTHESIA N/A 2023    Procedure: EXAM UNDER ANESTHESIA, DIGITAL, RECTUM;  Surgeon: Maxi Alford MD;  Location: [facility];  Service: General;  Laterality: N/A;  With possible fistulotomy, possible seton, possible sphincterotomy, possible hemorrhoidectomy    EXAMINATION UNDER ANESTHESIA N/A 2023    Procedure: Exam under anesthesia;  Surgeon: Davin Lombardi MD;  Location: [facility] OR;  Service: General;  Laterality: N/A;    hemarrhoids      INCISION AND DRAINAGE OF BUTTOCK N/A 2023    Procedure: INCISION AND DRAINAGE, BUTTOCK;  Surgeon: Davin Lombardi MD;  Location: [facility] OR;  Service: General;  Laterality: N/A;  gluteal abscess    LATERAL INTERNAL ANAL SPHINCTEROTOMY Right 10/11/2023    Procedure: SPHINCTEROTOMY, ANAL, INTERNAL, LATERAL;  Surgeon: Maxi Alford MD;  Location: [facility] OR;  Service: General;  Laterality: Right;    RECTAL FISTULOTOMY N/A 2023    Procedure: FISTULOTOMY, RECTUM;  Surgeon: Maxi Alford MD;  Location: [facility] OR;  Service: General;  Laterality: N/A;       FamHx:  History reviewed. No pertinent family history.    SocHx:  Social History     Socioeconomic History    Marital status:    Tobacco Use    Smoking status: Some Days     Current packs/day: 0.00     Average packs/day: 0.5 packs/day for 3.0 years (1.5 ttl pk-yrs)     Types: Cigarettes     Start date:      Last attempt to quit:      Years since quittin.9     Passive exposure: Current    Smokeless tobacco: Never    Tobacco comments:     A pack a week   Substance and Sexual Activity    Alcohol use: Not Currently    Drug use: Not Currently     Frequency: 1.0 times per week     Types: Marijuana     Comment: weekly    Sexual activity: Not Currently       Physical Exam:  Vitals:    23 1435   BP: 113/73   Pulse: 87  "  Temp: 98.5 °F (36.9 °C)     General: A&Ox3, no apparent distress, no deformities  Neck: No masses, normal thyroid  Lungs: CTA tommy, no use of accessory muscles  Heart: RRR, no arrhythmias  Abdomen: Soft, NT, ND, no masses, no hernias, no hepatosplenomegaly  Lymphatic: Neck and groin nodes negative  Skin: The skin is warm and dry. No jaundice.  Ext: No c/c/e.    GUMale: Test desc tommy, no abnormalities of epididymus. Penis circumcised, with normal penile and scrotal skin. Meatus normal. Normal rectal tone, no hemorrhoids. Prostate: 2 finger breadth wide, flat, smooth, soft, nontender, no nodules or masses appreciated. SV not palpable. Perineum and anus normal.    Labs:     Recent Labs     11/27/23  1416   PSA 0.47      No results for input(s): "TESTOSTERONE" in the last 760 hours.   Recent Labs     11/27/23  1416   CREATININE 1.07      No results for input(s): "CBC" in the last 760 hours.   No results for input(s): "FSH" in the last 760 hours.   No results for input(s): "LH" in the last 760 hours.   Recent Labs     11/27/23  1416   PROLACTIN 6.43      Invalid input(s): "URINE CULTURE"  Recent Labs     11/27/23  1416   ESTRADIOL 46       Urinalysis:  Results for orders placed or performed in visit on 12/07/23   POCT URINE DIPSTICK WITH MICROSCOPE, AUTOMATED   Result Value Ref Range    Color, UA Yellow     Spec Grav UA 1.030     pH, UA 5.5     WBC, UA Negative     Nitrite, UA Negative     Protein, POC Negative     Glucose, UA Negative     Ketones, UA Negative     Urobilinogen, UA 0.2 Short of follow up with his PCP for the PTH    Bilirubin, POC Negative     Blood, UA Negative    Microscopic urinalysis revealed negative RBCs, WBCs, nitrites.        Imaging:  Scrotal ultrasound 11/27/2023: 1.3 x 1.3 cm left epididymal head cyst.       Impression:  1. Urinary hesitancy  - POCT URINE DIPSTICK WITH MICROSCOPE, AUTOMATED      Plan: Instructed patient continue tamsulosin 0.4 mg p.o. daily RTC 6 months with PSA and scrotal " ultrasound.  Reinforced patient teaching on timed voiding every 2-3 hrs, double voiding, avoidance of bladder irritants such as alcohol, citrus foods, chocolate, caffeinated drinks, energy drinks, spicy foods, sugar, caffeine products, sodas. Instructed patient to avoid drinking fluids 1-2 hours prior to bedtime & void immediately before bedtime.  Instructed patient to follow up with PCP for elevated PTH.

## 2024-06-03 ENCOUNTER — TELEPHONE (OUTPATIENT)
Dept: UROLOGY | Facility: CLINIC | Age: 49
End: 2024-06-03
Payer: MEDICAID

## 2024-06-03 DIAGNOSIS — R39.11 URINARY HESITANCY: Primary | ICD-10-CM

## 2024-06-03 NOTE — TELEPHONE ENCOUNTER
Attempted to contact patient regarding rescheduling of appt due to imaging not being done. Unable to reach patient and unable to leave voicemail. Will wait for return call to verify new appt date and time.

## (undated) DEVICE — SOL 9P NACL IRR PIC IL

## (undated) DEVICE — TRAY SKIN SCRUB WET PREMIUM

## (undated) DEVICE — GLOVE SENSICARE PI GRN 7

## (undated) DEVICE — KIT SURGICAL TURNOVER

## (undated) DEVICE — GLOVE SIGNATURE MICRO LTX 7.5

## (undated) DEVICE — GLOVE PROTEXIS BLUE LATEX 8

## (undated) DEVICE — SPONGE COTTON TRAY 4X4IN

## (undated) DEVICE — GLOVE PROTEXIS BLUE LATEX 6.5

## (undated) DEVICE — GOWN POLY REINF BRTH SLV XL

## (undated) DEVICE — GLOVE PROTEXIS BLUE LATEX 7.5

## (undated) DEVICE — SEE L#152161

## (undated) DEVICE — SPONGE GELFOAM 12-7MM

## (undated) DEVICE — YANKAUER FLEX NO VENT REG CAP

## (undated) DEVICE — GLOVE PROTEXIS LTX MICRO 6.5

## (undated) DEVICE — NDL SAFETY 21G X 1 1/2 ECLPSE

## (undated) DEVICE — HANDLE DEVON RIGID OR LIGHT

## (undated) DEVICE — GLOVE PROTEXIS HYDROGEL SZ7.5

## (undated) DEVICE — MANIFOLD 4 PORT

## (undated) DEVICE — GAUZE CURAD IODOFORM .25IN 5YD

## (undated) DEVICE — SOLIDIFIER BOTTLE 1500CC

## (undated) DEVICE — CATH PROTECTIV PLUS 1.25 18G

## (undated) DEVICE — Device

## (undated) DEVICE — PAD CURAD NONADH 3X4IN

## (undated) DEVICE — SUT CHROMIC 3-0 SH 27IN GUT

## (undated) DEVICE — SUT 2-0 VICRYL / SH (J417)

## (undated) DEVICE — SPONGE GAUZE 16PLY 4X4

## (undated) DEVICE — GLOVE SIGNATURE MICRO LTX 7

## (undated) DEVICE — GLOVE SENSICARE PI GRN 6

## (undated) DEVICE — GLOVE PROTEXIS LTX MICRO  7.5

## (undated) DEVICE — COVER LIGHT HANDLE

## (undated) DEVICE — GLOVE SENSICARE PI GRN 8

## (undated) DEVICE — SPONGE SURGIFOAM 100 8.5X12X10

## (undated) DEVICE — GLOVE PROTEXIS PI SYN SURG 7.5

## (undated) DEVICE — SUT 3-0 VICRYL / SH (J416)

## (undated) DEVICE — SYR 10CC LUER LOCK

## (undated) DEVICE — DRESSING TELFA STRL 4X3 LF